# Patient Record
Sex: MALE | Race: OTHER | HISPANIC OR LATINO | URBAN - METROPOLITAN AREA
[De-identification: names, ages, dates, MRNs, and addresses within clinical notes are randomized per-mention and may not be internally consistent; named-entity substitution may affect disease eponyms.]

---

## 2020-12-06 ENCOUNTER — EMERGENCY (EMERGENCY)
Facility: HOSPITAL | Age: 40
LOS: 1 days | Discharge: HOME | End: 2020-12-06
Attending: EMERGENCY MEDICINE | Admitting: EMERGENCY MEDICINE
Payer: MEDICAID

## 2020-12-06 VITALS
SYSTOLIC BLOOD PRESSURE: 131 MMHG | OXYGEN SATURATION: 98 % | DIASTOLIC BLOOD PRESSURE: 90 MMHG | RESPIRATION RATE: 18 BRPM | TEMPERATURE: 98 F | HEART RATE: 80 BPM

## 2020-12-06 DIAGNOSIS — Z91.013 ALLERGY TO SEAFOOD: ICD-10-CM

## 2020-12-06 DIAGNOSIS — R05 COUGH: ICD-10-CM

## 2020-12-06 DIAGNOSIS — R10.84 GENERALIZED ABDOMINAL PAIN: ICD-10-CM

## 2020-12-06 DIAGNOSIS — Z20.828 CONTACT WITH AND (SUSPECTED) EXPOSURE TO OTHER VIRAL COMMUNICABLE DISEASES: ICD-10-CM

## 2020-12-06 DIAGNOSIS — R07.2 PRECORDIAL PAIN: ICD-10-CM

## 2020-12-06 LAB
ALBUMIN SERPL ELPH-MCNC: 4.2 G/DL — SIGNIFICANT CHANGE UP (ref 3.3–5)
ALP SERPL-CCNC: 59 U/L — SIGNIFICANT CHANGE UP (ref 40–120)
ALT FLD-CCNC: 19 U/L — SIGNIFICANT CHANGE UP (ref 10–45)
ANION GAP SERPL CALC-SCNC: 16 MMOL/L — SIGNIFICANT CHANGE UP (ref 5–17)
AST SERPL-CCNC: 25 U/L — SIGNIFICANT CHANGE UP (ref 10–40)
BASOPHILS # BLD AUTO: 0.08 K/UL — SIGNIFICANT CHANGE UP (ref 0–0.2)
BASOPHILS NFR BLD AUTO: 0.7 % — SIGNIFICANT CHANGE UP (ref 0–2)
BILIRUB SERPL-MCNC: 0.2 MG/DL — SIGNIFICANT CHANGE UP (ref 0.2–1.2)
BUN SERPL-MCNC: 9 MG/DL — SIGNIFICANT CHANGE UP (ref 7–23)
CALCIUM SERPL-MCNC: 9.2 MG/DL — SIGNIFICANT CHANGE UP (ref 8.4–10.5)
CHLORIDE SERPL-SCNC: 99 MMOL/L — SIGNIFICANT CHANGE UP (ref 96–108)
CO2 SERPL-SCNC: 23 MMOL/L — SIGNIFICANT CHANGE UP (ref 22–31)
CREAT SERPL-MCNC: 0.57 MG/DL — SIGNIFICANT CHANGE UP (ref 0.5–1.3)
EOSINOPHIL # BLD AUTO: 0.05 K/UL — SIGNIFICANT CHANGE UP (ref 0–0.5)
EOSINOPHIL NFR BLD AUTO: 0.4 % — SIGNIFICANT CHANGE UP (ref 0–6)
GLUCOSE SERPL-MCNC: 100 MG/DL — HIGH (ref 70–99)
HCT VFR BLD CALC: 35 % — LOW (ref 39–50)
HGB BLD-MCNC: 10.8 G/DL — LOW (ref 13–17)
IMM GRANULOCYTES NFR BLD AUTO: 0.3 % — SIGNIFICANT CHANGE UP (ref 0–1.5)
LIDOCAIN IGE QN: 20 U/L — SIGNIFICANT CHANGE UP (ref 7–60)
LYMPHOCYTES # BLD AUTO: 18.2 % — SIGNIFICANT CHANGE UP (ref 13–44)
LYMPHOCYTES # BLD AUTO: 2.12 K/UL — SIGNIFICANT CHANGE UP (ref 1–3.3)
MCHC RBC-ENTMCNC: 26.2 PG — LOW (ref 27–34)
MCHC RBC-ENTMCNC: 30.9 GM/DL — LOW (ref 32–36)
MCV RBC AUTO: 84.7 FL — SIGNIFICANT CHANGE UP (ref 80–100)
MONOCYTES # BLD AUTO: 0.71 K/UL — SIGNIFICANT CHANGE UP (ref 0–0.9)
MONOCYTES NFR BLD AUTO: 6.1 % — SIGNIFICANT CHANGE UP (ref 2–14)
NEUTROPHILS # BLD AUTO: 8.65 K/UL — HIGH (ref 1.8–7.4)
NEUTROPHILS NFR BLD AUTO: 74.3 % — SIGNIFICANT CHANGE UP (ref 43–77)
NRBC # BLD: 0 /100 WBCS — SIGNIFICANT CHANGE UP (ref 0–0)
PLATELET # BLD AUTO: 220 K/UL — SIGNIFICANT CHANGE UP (ref 150–400)
POTASSIUM SERPL-MCNC: 3.4 MMOL/L — LOW (ref 3.5–5.3)
POTASSIUM SERPL-SCNC: 3.4 MMOL/L — LOW (ref 3.5–5.3)
PROT SERPL-MCNC: 7.1 G/DL — SIGNIFICANT CHANGE UP (ref 6–8.3)
RBC # BLD: 4.13 M/UL — LOW (ref 4.2–5.8)
RBC # FLD: 15.1 % — HIGH (ref 10.3–14.5)
SODIUM SERPL-SCNC: 138 MMOL/L — SIGNIFICANT CHANGE UP (ref 135–145)
TROPONIN T SERPL-MCNC: <0.01 NG/ML — SIGNIFICANT CHANGE UP (ref 0–0.01)
WBC # BLD: 11.65 K/UL — HIGH (ref 3.8–10.5)
WBC # FLD AUTO: 11.65 K/UL — HIGH (ref 3.8–10.5)

## 2020-12-06 PROCEDURE — 83690 ASSAY OF LIPASE: CPT

## 2020-12-06 PROCEDURE — 99285 EMERGENCY DEPT VISIT HI MDM: CPT | Mod: 25

## 2020-12-06 PROCEDURE — 36415 COLL VENOUS BLD VENIPUNCTURE: CPT

## 2020-12-06 PROCEDURE — U0003: CPT

## 2020-12-06 PROCEDURE — 80053 COMPREHEN METABOLIC PANEL: CPT

## 2020-12-06 PROCEDURE — 96374 THER/PROPH/DIAG INJ IV PUSH: CPT

## 2020-12-06 PROCEDURE — 71046 X-RAY EXAM CHEST 2 VIEWS: CPT | Mod: 26

## 2020-12-06 PROCEDURE — 93005 ELECTROCARDIOGRAM TRACING: CPT

## 2020-12-06 PROCEDURE — 99284 EMERGENCY DEPT VISIT MOD MDM: CPT | Mod: 25

## 2020-12-06 PROCEDURE — 71046 X-RAY EXAM CHEST 2 VIEWS: CPT

## 2020-12-06 PROCEDURE — 93010 ELECTROCARDIOGRAM REPORT: CPT

## 2020-12-06 PROCEDURE — 85025 COMPLETE CBC W/AUTO DIFF WBC: CPT

## 2020-12-06 PROCEDURE — 84484 ASSAY OF TROPONIN QUANT: CPT

## 2020-12-06 RX ORDER — FAMOTIDINE 10 MG/ML
20 INJECTION INTRAVENOUS ONCE
Refills: 0 | Status: COMPLETED | OUTPATIENT
Start: 2020-12-06 | End: 2020-12-06

## 2020-12-06 RX ORDER — LIDOCAINE 4 G/100G
10 CREAM TOPICAL ONCE
Refills: 0 | Status: COMPLETED | OUTPATIENT
Start: 2020-12-06 | End: 2020-12-06

## 2020-12-06 RX ADMIN — LIDOCAINE 10 MILLILITER(S): 4 CREAM TOPICAL at 19:55

## 2020-12-06 RX ADMIN — FAMOTIDINE 20 MILLIGRAM(S): 10 INJECTION INTRAVENOUS at 19:55

## 2020-12-06 RX ADMIN — Medication 30 MILLILITER(S): at 19:55

## 2020-12-06 NOTE — ED PROVIDER NOTE - CLINICAL SUMMARY MEDICAL DECISION MAKING FREE TEXT BOX
Patient is a 39 year old male with no PMH currently undomiciled with history of EtOH abuse and unknown h/o EtOH withdrawal, who comes into the ED today with abdominal and chest pain which started two days ago and is worse with EtOH. Pt appears well although tearful, abdomen soft and non tender, heart normal rate and rhythm and lungs are clear to auscultations. Plan to obtain labs, give medications, get chest x-ray and reassess. Patient is a 39 year old male with no PMH currently undomiciled with history of EtOH abuse and unknown h/o EtOH withdrawal, who comes into the ED today with abdominal and chest pain which started two days ago and is worse with EtOH. Pt appears well although tearful, abdomen soft and non tender, heart normal rate and rhythm and lungs are clear to auscultations. Plan to obtain labs, give medications, get chest x-ray and reassess. labs reviewed, no acute finding ekg/cxr. believe pt medically cleared. ?gastritis 2/2 alcohol or food ingestion 2 days ago. social work consulted-- pt provided with shelter information as well as detox. pt does nto appear in withdrawal. pt medically cleared. pt dc home in stable condition. ED evaluation and management discussed with the patient and family (if available) in detail.  Close PMD follow up encouraged.  Strict ED return instructions discussed in detail and patient given the opportunity to ask any questions about their discharge diagnosis and instructions. Patient verbalized understanding. Patient is agreeable to plan. Patient is a 39 year old male with no PMH currently undomiciled with history of EtOH abuse and unknown h/o EtOH withdrawal, who comes into the ED today with abdominal and chest pain which started two days ago and is worse with EtOH. Pt appears well although tearful, abdomen soft and non tender, heart normal rate and rhythm and lungs are clear to auscultations. Plan to obtain labs, give medications, get chest x-ray and reassess. labs reviewed, no acute finding ekg/cxr. believe pt medically cleared. ?gastritis 2/2 alcohol or food ingestion 2 days ago. social work consulted-- pt provided with shelter information as well as detox. pt does not appear in withdrawal. pt medically cleared. pt dc home in stable condition. ED evaluation and management discussed with the patient and family (if available) in detail.  Close PMD follow up encouraged.  Strict ED return instructions discussed in detail and patient given the opportunity to ask any questions about their discharge diagnosis and instructions. Patient verbalized understanding. Patient is agreeable to plan.

## 2020-12-06 NOTE — ED PROVIDER NOTE - PATIENT PORTAL LINK FT
You can access the FollowMyHealth Patient Portal offered by [facility] by registering at the following website: [email or web address]. By joining [employer]’s FollowMyHealth portal, you will also be able to view your health information using other applications (apps) compatible with our system.

## 2020-12-06 NOTE — ED ADULT NURSE NOTE - CHPI ED NUR SYMPTOMS NEG
no fever/no chills/no dysuria/no hematuria/no nausea/no abdominal distension/no burning urination/no blood in stool/no diarrhea/no vomiting

## 2020-12-06 NOTE — ED PROVIDER NOTE - PHYSICAL EXAMINATION
General: Patient is well developed and well nourished. Patient is alert and oriented to person, place and date. Patient is sitting stretcher and appears in no acute distress.  HEENT: Head is normocephalic and atraumatic. Pupils are equal, round and reactive. Extraocular movements intact. No evidence of nystagmus, conjunctival injection, or scleral icterus. External ears symmetric without evidence of discharge.  Nose is symmetric, non-tender, patent without evidence of discharge. Teeth in good repair. Uvula midline.   Neck: Supple with no evidence of lymphadenopathy.  Full range of motion.  Heart: Regular rate and rhythm. No murmurs, rubs or gallops.   Lungs: Clear to auscultation bilaterally with equal chest expansion. No note of wheezes, rhonchi, rales. Equal chest expansion. No note of retractions.  Abdomen: Bowel sounds present in all four quadrants. Soft, non-tender, non-distended without signs of masses, rebound or guarding. No note of hepatosplenomegaly. No CVA tenderness bilaterally. Negative Lock sign or McBurney's.  Musculoskeletal: No edema, erythema, ecchymosis, atrophy or deformity. F No clubbing or cyanosis. No point tenderness to palpation.   Neuro: Cranial nerves II-XII intact. GCS 15. Moving all extremities. Strength is 5/5 arms and legs bilaterally. Sensation intact in extremities. gait steady   Skin: Warm, dry and intact without evidence of rashes, bruising, pallor, jaundice or cyanosis.   Psych: Mood and affect appropriate.

## 2020-12-06 NOTE — ED PROVIDER NOTE - OBJECTIVE STATEMENT
Patient is a 39 year old male with no PMH, currently undomiciled, who presents to the ED today c/o abdominal and chest pain starting two days ago after he ate some "bad food". Patient does not endorse nausea, vomiting, diarrhea, palpitations, fever or chills, and no change in bowel movement. Patient notes he also has a cough present on the left side of chest which he has never had before.   Patient is a daily EtOH drinker, though he notes it is circumstantial as he lost his safe haven housing eight weeks ago and is now living on the streets. He has no  or  at this point in time. Patient has no h/o EtOH withdrawal and no h/o seizures; patient is unsure if he can go one day without EtOH without experiencing withdrawal symptoms. Patient is a 39 year old male with no PMH, currently undomiciled, who presents to the ED today c/o abdominal and chest pain starting two days ago after he ate some "bad food". Patient does not endorse nausea, vomiting, diarrhea, palpitations, fever or chills, and no change in bowel movement. Patient notes he also has a nonradiating nonpositional nonexertinal nonpleuritic pain present on the left side of chest which he has never had before.   Patient is a recent daily EtOH drinker, though he notes it is circumstantial as he lost his safe haven housing eight weeks ago and is now living on the streets. He has no  or  at this point in time. Patient has no h/o EtOH withdrawal and no h/o seizures; patient is unsure if he can go one day without EtOH without experiencing withdrawal symptoms.

## 2020-12-06 NOTE — ED ADULT NURSE NOTE - NSIMPLEMENTINTERV_GEN_ALL_ED
Implemented All Fall Risk Interventions:  [place] to call system. Call bell, personal items and telephone within reach. Instruct patient to call for assistance. Room bathroom lighting operational. Non-slip footwear when patient is off stretcher. Physically safe environment: no spills, clutter or unnecessary equipment. Stretcher in lowest position, wheels locked, appropriate side rails in place. Provide visual cue, wrist band, yellow gown, etc. Monitor gait and stability. Monitor for mental status changes and reorient to person, place, and time. Review medications for side effects contributing to fall risk. Reinforce activity limits and safety measures with patient and family.

## 2020-12-06 NOTE — ED PROVIDER NOTE - ATTENDING CONTRIBUTION TO CARE
avss. nontoxic. NAD. no systemic sx. no active cp. no acute resp distress. abd soft, nd, nttp. no scrotal pain. no red flags. no e/o sepsis. LFTs/lipase neg. trop neg. ekg w/o acute abnl. cxr w/o acute focal consol vs ptx vs pulm edema. tolerating po. pt upset/tearful re: being recently kicked out of shelter and having "no place to go." social work consulted. resources provided. no indication for emergent ct imaging at this time. will dc w/ outpatient pcp fu, social work resources. strict return precautions. pt agrees w/ plan. questions answered.     I saw and discussed the care of the pt directly with the ACP while the pt was in the ED. i have reviewed the ACP note and agree w/ the history, exam and plan of care other than as noted above.

## 2020-12-06 NOTE — ED ADULT NURSE NOTE - OBJECTIVE STATEMENT
Pt presents to ED c/o abdominal pain. Pt states "I ate something bad two days ago, I started getting stomach pain in the upper part of my stomach. It has been getting worse and its moving towards my chest. I have no where to sleep so I have been walking around in the cold for two days, my feet hurt and I am tired". Pt endorses alcohol and marijuana use today. Denies hx of alcohol withdrawals. A&ox4, ambulatory w steady gait. Denies fevers, chills, n/v/d, sob, lightheadedness, dizziness.

## 2020-12-06 NOTE — ED ADULT NURSE NOTE - NS ED NURSE ELOPE COMMENTS
pt refused to be referred to the shelter/facility. decided to walk out, no deficit, A+Ox4, steady gait, denies sob, cp, abd .pain at this time.

## 2020-12-07 LAB — SARS-COV-2 RNA SPEC QL NAA+PROBE: SIGNIFICANT CHANGE UP

## 2022-01-17 ENCOUNTER — EMERGENCY (EMERGENCY)
Facility: HOSPITAL | Age: 42
LOS: 1 days | Discharge: HOME | End: 2022-01-17
Attending: EMERGENCY MEDICINE | Admitting: EMERGENCY MEDICINE
Payer: MEDICAID

## 2022-01-17 VITALS
HEART RATE: 100 BPM | DIASTOLIC BLOOD PRESSURE: 91 MMHG | OXYGEN SATURATION: 98 % | WEIGHT: 225.09 LBS | TEMPERATURE: 99 F | RESPIRATION RATE: 19 BRPM | SYSTOLIC BLOOD PRESSURE: 141 MMHG

## 2022-01-17 DIAGNOSIS — R05.9 COUGH, UNSPECIFIED: ICD-10-CM

## 2022-01-17 DIAGNOSIS — F17.200 NICOTINE DEPENDENCE, UNSPECIFIED, UNCOMPLICATED: ICD-10-CM

## 2022-01-17 DIAGNOSIS — U07.1 COVID-19: ICD-10-CM

## 2022-01-17 DIAGNOSIS — Z91.013 ALLERGY TO SEAFOOD: ICD-10-CM

## 2022-01-17 PROBLEM — F10.10 ALCOHOL ABUSE, UNCOMPLICATED: Chronic | Status: ACTIVE | Noted: 2020-12-06

## 2022-01-17 LAB — SARS-COV-2 RNA SPEC QL NAA+PROBE: DETECTED

## 2022-01-17 PROCEDURE — 99284 EMERGENCY DEPT VISIT MOD MDM: CPT

## 2022-01-17 RX ORDER — ONDANSETRON 8 MG/1
4 TABLET, FILM COATED ORAL ONCE
Refills: 0 | Status: COMPLETED | OUTPATIENT
Start: 2022-01-17 | End: 2022-01-17

## 2022-01-17 RX ORDER — IBUPROFEN 200 MG
400 TABLET ORAL ONCE
Refills: 0 | Status: COMPLETED | OUTPATIENT
Start: 2022-01-17 | End: 2022-01-17

## 2022-01-17 RX ORDER — ACETAMINOPHEN 500 MG
975 TABLET ORAL ONCE
Refills: 0 | Status: COMPLETED | OUTPATIENT
Start: 2022-01-17 | End: 2022-01-17

## 2022-01-17 RX ADMIN — ONDANSETRON 4 MILLIGRAM(S): 8 TABLET, FILM COATED ORAL at 13:41

## 2022-01-17 RX ADMIN — Medication 400 MILLIGRAM(S): at 13:41

## 2022-01-17 RX ADMIN — Medication 975 MILLIGRAM(S): at 13:40

## 2022-01-17 RX ADMIN — Medication 100 MILLIGRAM(S): at 13:41

## 2022-01-17 NOTE — ED PROVIDER NOTE - PROGRESS NOTE DETAILS
Jenaro KAPOOR (PGY-3): Patient becoming agitated, verbally abusive, refusing to wear a mask, refusing to remain in room with door closed, refused CXR, stating he will not let a male tech to touch him, no female tech available, explained to patient I will remain in room with him with male xray tech, but patient began yelling and refusing, spit on wall. Security called to escort patient out of Emergency Department. Unable to verbally de-escalate situation. Patient escorted out of ED.

## 2022-01-17 NOTE — ED ADULT NURSE NOTE - NSIMPLEMENTINTERV_GEN_ALL_ED
Implemented All Universal Safety Interventions:  [place] to call system. Call bell, personal items and telephone within reach. Instruct patient to call for assistance. Room bathroom lighting operational. Non-slip footwear when patient is off stretcher. Physically safe environment: no spills, clutter or unnecessary equipment. Stretcher in lowest position, wheels locked, appropriate side rails in place.

## 2022-01-17 NOTE — ED ADULT NURSE NOTE - SUICIDE SCREENING QUESTION 2
Plan of care discussed with patient at start of shift. Free from falls and injuries. Resting quietly with eyes closed. Respirations even, unlabored. Skin warm and dry. Denies pain. Denies nausea. Afebrile this shift. Telemetry in use with a fib noted. Frequent checks for pain and safety maintained. Bed in lowest position, wheels locked, side rails up x's 2, call light in reach. Instructed to call for assistance as needed, verbalizes understanding. Will continue to monitor.    Patient refused

## 2022-01-17 NOTE — ED ADULT NURSE NOTE - CAS ELECT INFOMATION PROVIDED
ESCORTED OUT WITH SEVERAL SECURITY AND [employer]. PT THREATENING STAFF AND TRYING TO COUGH ON STAFF MEMBERS. ESCALATED TO CHARGE RN FOR COMPLEX CARE NOTE

## 2022-01-17 NOTE — ED PROVIDER NOTE - OBJECTIVE STATEMENT
41y M w/ no PMHx presenting with cough, sob, nausea, sore throat and intermittent fever x5 days. When asked if patient is COVID vaccinated, responds "I don't know". States he got into a verbal altercation with stranger on street, was "spit on" and then the next day developed symptoms. States he has been only taking Aleve at home for his symptoms, last dose was at 4am. Lives with roommate. Denies dizziness, chest pain, abdominal pain, diarrhea, bloody stools, dysuria, hematuria, rash. Patient becoming agitated in room while obtaining history.

## 2022-01-17 NOTE — ED PROVIDER NOTE - ATTENDING CONTRIBUTION TO CARE
Pt presents w several days of cough and body aches. Evaluated and then became aggressive, verbally abusive towards staff. Had to be escorted out bu security. Had normal O2 sats. Low concern for emergent medical condition.

## 2022-01-17 NOTE — ED PROVIDER NOTE - PATIENT PORTAL LINK FT
You can access the FollowMyHealth Patient Portal offered by [facility] by registering at the following website: [email or web address]. By joining [employer]’s FollowMyHealth portal, you will also be able to view your health information using other applications (apps) compatible with our system.

## 2022-01-17 NOTE — ED ADULT NURSE NOTE - OBJECTIVE STATEMENT
pt c/o lungs hurting and not feeling well. pt aggressive, angry, hostile during primary RN assessment. unable to auscultate lungs and heart due to agitation. +yelling in full sentences. spitting on the wall

## 2022-01-17 NOTE — ED PROVIDER NOTE - PHYSICAL EXAMINATION
Physical Exam:  Gen: NAD, AOx3, non-toxic appearing, able to ambulate without assistance  HEENT: EOMI, PEERL, normal conjunctiva, tongue midline, oral mucosa moist  Lung: CTAB, no respiratory distress, no wheezes/rhonchi/rales B/L, speaking in full sentences  CV: RRR, no murmurs, rubs or gallops  Abd: soft, NT, ND, no guarding  MSK: no visible deformities, ROM normal in UE/LE, no back pain  Neuro: No focal sensory or motor deficits  Skin: Warm, well perfused, no rash, no leg swelling  Psych: agitated  Priyanka Eason D.O. -Resident

## 2022-01-17 NOTE — ED PROVIDER NOTE - NS ED ROS FT
CONSTITUTIONAL: + fevers, no chills, no lightheadedness, no dizziness  EYES: no visual changes, no eye pain  EARS: no ear drainage, no ear pain, no change in hearing  NOSE: no nasal congestion  MOUTH/THROAT: + sore throat  CV: No chest pain, no palpitations  RESP: + SOB, + cough  GI: +nausea, no v/d, no abd pain  : no dysuria, no hematuria, no flank pain  MSK: no back pain, no extremity pain  SKIN: no rashes  NEURO: no headache, no focal weakness, no decreased sensation/paresthesias

## 2022-01-17 NOTE — ED PROVIDER NOTE - CLINICAL SUMMARY MEDICAL DECISION MAKING FREE TEXT BOX
41y M w/ no PMHx presenting with cough, sob, nausea, sore throat and intermittent fever x5 days. , low-grade fever, lungs CTAB, abd soft, NT, heart sounds clear. Unknown vaccination hx, no active chest pain, patient speaking in full sentences, likely viral etiology, will obtain CXR, COVID swab, treat symptomatically and reassess.

## 2022-01-17 NOTE — ED PROVIDER NOTE - NSFOLLOWUPINSTRUCTIONS_ED_ALL_ED_FT
History of Present Illness


Admission Date/PCP: 


  11/13/19 15:14





  SID HERCULES MD





History of Present Illness: 


IVETT MIRANDA is a 77 year old female who came into the emergency room for an 

episode of vertigo this morning.  Patient states she has had 2 or 3 of these in 

fact is seeing her primary care provider for this and was given a prescription 

for for what sounds like meclizine.  


Patient came into the emergency room for this vertigo-like symptom.  She states 

that the room is spinning.  Symptoms only lasted about 5 minutes and since then 

she is been feeling well.





She came to the emergency room she was somewhat hypoxic with sat of 88 or 8089 

on room air, supposed to be using home O2 as needed but she does not.  ER 

provider sent her for a CT angiogram to make sure she did not have a pulmonary 

embolism while she was in the scanner she developed what sounds to be flash pu

lmonary edema.  Patient had crackles and was noticeably short of breath with an 

O2 sat of 77% back in the ER.





Patient confesses that over the last 2 months she is gained 20 pounds since 

starting a new medication, trilogy.  She also admits that she not take her 

diuretic on a daily basis as she is supposed to. 


Chest x-ray was unremarkable but CT scan does show possible pneumonia.  Patient 

will be put in the hospital for observation status and started on antibiotics, 

ever I am not convinced that this to be treated in light of her symptoms, or 

lack thereof.  No fever no chills no white count no sputum production.





Will order a CT without contrast because of her vertigo symptoms








Past Medical History


Cardiac Medical History: Reports: Hyperlipidema, Hypertension


Pulmonary Medical History: Reports: Chronic Obstructive Pulmonary Disease (COPD)

- emphysema


Endocrine Medical History: Reports: Diabetes Mellitus Type 2





Social History


Smoking Status: Former Smoker





- Advance Directive


Resuscitation Status: Full Code





Family History


Family History: Reviewed & Not Pertinent


Parental Family History Reviewed: No


Children Family History Reviewed: No


Sibling(s) Family History Reviewed.: No





Medication/Allergy


Allergies/Adverse Reactions: 


                                        





aspirin [Aspirin] Allergy (Verified 11/13/19 10:12)


   


Penicillins Allergy (Verified 11/13/19 10:12)


   











Review of Systems


Constitutional: PRESENT: weight gain - Vertigo and 20 pound weight gain in 2 

months, other - Vertigo


Respiratory: ABSENT: cough, hemoptysis


Gastrointestinal: ABSENT: abdominal pain, constipation, diarrhea, hematemesis, 

hematochezia, nausea, vomiting


Neurological: PRESENT: vertigo


Psychiatric: ABSENT: anxiety, depression, homidical ideation, suicidal ideation





Physical Exam


Vital Signs: 


                                        











Temp Pulse Resp BP Pulse Ox


 


 98.7 F   82   28 H  131/59 H  98 


 


 11/13/19 10:21  11/13/19 09:58  11/13/19 14:30  11/13/19 12:00  11/13/19 14:30








                                 Intake & Output











 11/12/19 11/13/19 11/14/19





 06:59 06:59 06:59


 


Intake Total   50


 


Balance   50


 


Weight   81.647 kg











General appearance: PRESENT: no acute distress, mild distress, well-developed, 

well-nourished, other - Patient just vomited after she took an oral dose of 

Zithromax However no shortness of breath


Respiratory exam: PRESENT: clear to auscultation deric.  ABSENT: rales, rhonchi, 

wheezes


Cardiovascular exam: PRESENT: RRR.  ABSENT: diastolic murmur, rubs, systolic mu

rmur


Extremities exam: PRESENT: +1 edema


Neurological exam: PRESENT: alert, awake, oriented to person, oriented to place,

 oriented to time, oriented to situation, CN II-XII grossly intact.  ABSENT: 

motor sensory deficit


Psychiatric exam: PRESENT: appropriate affect, normal mood.  ABSENT: homicidal 

ideation, suicidal ideation





Results


Laboratory Results: 


                                        





                                 11/13/19 09:49 





                                 11/13/19 09:49 





                                        











  11/13/19 11/13/19 11/13/19





  09:49 09:49 12:53


 


WBC  8.8  


 


RBC  4.84  


 


Hgb  15.0  


 


Hct  42.8  


 


MCV  89  


 


MCH  31.0  


 


MCHC  34.9  


 


RDW  13.3  


 


Plt Count  357  


 


Seg Neutrophils %  Not Reportable  


 


Sodium   143.9 


 


Potassium   4.1 


 


Chloride   104 


 


Carbon Dioxide   28 


 


Anion Gap   12 


 


BUN   10 


 


Creatinine   0.57 


 


Est GFR ( Amer)   > 60 


 


Glucose   162 H 


 


Calcium   9.4 


 


Total Bilirubin   1.0 


 


AST   28 


 


Alkaline Phosphatase   58 


 


Total Protein   7.9 


 


Albumin   4.5 


 


Urine Color    YELLOW


 


Urine Appearance    CLEAR


 


Urine pH    7.0


 


Ur Specific Gravity    1.010


 


Urine Protein    NEGATIVE


 


Urine Glucose (UA)    NEGATIVE


 


Urine Ketones    NEGATIVE


 


Urine Blood    NEGATIVE


 


Urine Nitrite    NEGATIVE


 


Ur Leukocyte Esterase    TRACE H


 


Urine WBC (Auto)    1








                                        











  11/13/19





  09:49


 


Troponin I  < 0.012











Impressions: 


                                        





Chest X-Ray  11/13/19 10:10


IMPRESSION:  Limited airspace disease in the left base, atelectasis versus 

pneumonia.


 








Chest/Abdomen CTA  11/13/19 13:17


IMPRESSION:  Bibasilar atelectasis.  Lingular infiltrate either atelectasis or 

focal pneumonia.


Bilateral emphysematous changes.


No pulmonary emboli.


 














Assessment and Plan





- Diagnosis


(1) Pulmonary edema


Is this a current diagnosis for this admission?: Yes   





(2) Hypertension


Is this a current diagnosis for this admission?: Yes   





(3) Diabetes


Is this a current diagnosis for this admission?: Yes   





(4) COPD (chronic obstructive pulmonary disease)


Is this a current diagnosis for this admission?: Yes   





(5) Hypoxia


Is this a current diagnosis for this admission?: Yes   





(6) Respiratory distress


Is this a current diagnosis for this admission?: Yes   





(7) Vertigo


Is this a current diagnosis for this admission?: Yes   





- Plan Summary


Summary: 


Will treat patient for pulmonary edema with Lasix 20 mg every 12 hours, 10 you t

he IV Rocephin for another 12 to 24 hours pending labs, order CT head scan 

without contrast for her vertigo.


Anticipate discharge either tomorrow or Friday.  May or may not be treated as an

 outpatient for pneumonia





- Time


Time Spent with patient: 35 or more minutes - Lab and imaging results, if performed, were discussed with you along with your discharge diagnosis    - Follow up with your doctor in 1 week    - Return to the ED for any new, worsening, or concerning symptoms to you, worsening shortness of breath, chest pain    - Continue all prescribed medications    - Take ibuprofen/tylenol as directed as needed for pain  To control your pain at home, you should take Ibuprofen 400 mg along with Tylenol 650mg-1000mg every 6 to 8 hours. Limit your maximum daily Tylenol from all sources to 4000mg. Be aware that many other medications contain acetaminophen which is also known as Tylenol. Taking Tylenol and Ibuprofen together has been shown to be more effective at relieving pain than taking them separately. These are both over the counter medications that you can  at your local pharmacy without a prescription. You need to respect all of the warnings on the bottles. You shouldn’t take these medications for more than a week without following up with your doctor. Both medications come with certain risks and side effects that you need to discuss with your doctor, especially if you are taking them for a prolonged period.    - Rest and keep yourself hydrated with fluids

## 2022-02-12 ENCOUNTER — EMERGENCY (EMERGENCY)
Facility: HOSPITAL | Age: 42
LOS: 1 days | Discharge: LEFT BEFORE DISCHARGE | End: 2022-02-12
Admitting: EMERGENCY MEDICINE
Payer: SELF-PAY

## 2022-02-12 VITALS
RESPIRATION RATE: 18 BRPM | DIASTOLIC BLOOD PRESSURE: 84 MMHG | TEMPERATURE: 97 F | SYSTOLIC BLOOD PRESSURE: 145 MMHG | OXYGEN SATURATION: 99 % | HEART RATE: 102 BPM

## 2022-02-12 DIAGNOSIS — Z53.21 PROCEDURE AND TREATMENT NOT CARRIED OUT DUE TO PATIENT LEAVING PRIOR TO BEING SEEN BY HEALTH CARE PROVIDER: ICD-10-CM

## 2022-02-12 PROCEDURE — L9991: CPT

## 2022-02-12 NOTE — ED ADULT TRIAGE NOTE - CHIEF COMPLAINT QUOTE
pt BIBA complaining of not feeling well after using crystal meth three days ago. pt aggressive and yelling in triage. violent with ems and threatening. escorted out by security from triage for violent behavior.

## 2022-03-25 ENCOUNTER — EMERGENCY (EMERGENCY)
Facility: HOSPITAL | Age: 42
LOS: 1 days | Discharge: HOME | End: 2022-03-25
Attending: EMERGENCY MEDICINE | Admitting: EMERGENCY MEDICINE
Payer: MEDICAID

## 2022-03-25 VITALS
RESPIRATION RATE: 18 BRPM | SYSTOLIC BLOOD PRESSURE: 122 MMHG | TEMPERATURE: 98 F | HEART RATE: 100 BPM | DIASTOLIC BLOOD PRESSURE: 76 MMHG | OXYGEN SATURATION: 96 %

## 2022-03-25 VITALS
SYSTOLIC BLOOD PRESSURE: 108 MMHG | OXYGEN SATURATION: 95 % | HEART RATE: 110 BPM | DIASTOLIC BLOOD PRESSURE: 71 MMHG | RESPIRATION RATE: 1 BRPM | TEMPERATURE: 99 F

## 2022-03-25 DIAGNOSIS — F10.920 ALCOHOL USE, UNSPECIFIED WITH INTOXICATION, UNCOMPLICATED: ICD-10-CM

## 2022-03-25 PROCEDURE — 99284 EMERGENCY DEPT VISIT MOD MDM: CPT

## 2022-03-25 NOTE — ED ADULT NURSE NOTE - OBJECTIVE STATEMENT
40 y/o male who admits to drinking alcohol- pt picked up at [facility]- presents with no obvious injury or trauma

## 2022-03-25 NOTE — ED PROVIDER NOTE - NS ED ROS FT
BIB EMS for EtOH intoxication.  Admits to heavy EtOH use today.  No acute medical complaints or apparent trauma noted. Unable to cooperate with remainder of ROS due to clinical condition/AMS.

## 2022-03-25 NOTE — ED PROVIDER NOTE - OBJECTIVE STATEMENT
BIB EMS for EtOH intoxication.  Admits to heavy EtOH use today.  No acute medical complaints or apparent trauma noted. Unable to cooperate with remainder of history due to clinical condition/AMS.

## 2022-03-25 NOTE — ED PROVIDER NOTE - PATIENT PORTAL LINK FT
You can access the FollowMyHealth Patient Portal offered by [facility] by registering at the following website: [email or web address]. By joining [employer]’s FollowMyHealth portal, you will also be able to view your health information using other applications (apps) compatible with our system.

## 2022-06-19 ENCOUNTER — EMERGENCY (EMERGENCY)
Facility: HOSPITAL | Age: 42
LOS: 0 days | Discharge: HOME | End: 2022-06-19
Attending: EMERGENCY MEDICINE
Payer: SELF-PAY

## 2022-06-19 VITALS
OXYGEN SATURATION: 100 % | TEMPERATURE: 98 F | HEART RATE: 75 BPM | DIASTOLIC BLOOD PRESSURE: 76 MMHG | RESPIRATION RATE: 18 BRPM | SYSTOLIC BLOOD PRESSURE: 134 MMHG

## 2022-06-19 VITALS
WEIGHT: 259.93 LBS | OXYGEN SATURATION: 97 % | HEART RATE: 99 BPM | DIASTOLIC BLOOD PRESSURE: 61 MMHG | SYSTOLIC BLOOD PRESSURE: 121 MMHG | RESPIRATION RATE: 18 BRPM | HEIGHT: 70 IN | TEMPERATURE: 99 F

## 2022-06-19 DIAGNOSIS — F10.920 ALCOHOL USE, UNSPECIFIED WITH INTOXICATION, UNCOMPLICATED: ICD-10-CM

## 2022-06-19 DIAGNOSIS — Z59.00 HOMELESSNESS UNSPECIFIED: ICD-10-CM

## 2022-06-19 PROCEDURE — 99053 MED SERV 10PM-8AM 24 HR FAC: CPT

## 2022-06-19 PROCEDURE — 99284 EMERGENCY DEPT VISIT MOD MDM: CPT

## 2022-06-19 PROCEDURE — 99285 EMERGENCY DEPT VISIT HI MDM: CPT

## 2022-06-19 SDOH — ECONOMIC STABILITY - HOUSING INSECURITY: HOMELESSNESS UNSPECIFIED: Z59.00

## 2022-06-19 NOTE — ED PROVIDER NOTE - CLINICAL SUMMARY MEDICAL DECISION MAKING FREE TEXT BOX
Rudy OREILLY: No trauma, ETOH use/abuse, await to sober, reassess. If VSS, ambulatory, tolerating PO, will dc.

## 2022-06-19 NOTE — ED ADULT TRIAGE NOTE - CHIEF COMPLAINT QUOTE
Per EMS[place] +ETOH. At triage, patient refusing to answer any questions, sleeping but able to arouse, VS stable, no distress noted. Unknown which knee has pain, moving both legs.

## 2022-06-19 NOTE — ED PROVIDER NOTE - NSFOLLOWUPINSTRUCTIONS_ED_ALL_ED_FT
Please follow up with your primary care physician if you do not have one then I have provided you with the number for a follow up clinic. Please note that you have no complaints of any pain in the ED now. you are eating, drinking, ambulatory. Please follow up with your primary care physician, return to us if any health concerns. Please come to us if you wish to get help with kicking your alcohol addiction. You can also contact [facility] and they can offer you some more support.    For all other health concerns return to us immediately.    ___________  Alcohol Intoxication      Alcohol intoxication occurs when a person no longer thinks clearly or functions well (becomes impaired) after drinking alcohol. Intoxication can occur with just one drink. The legal definition of alcohol intoxication depends on the amount of alcohol in the blood (blood alcohol concentration, JESSICA). JESSICA of 80–100 mg/dL or higher is commonly considered legally intoxicated. The level of impairment depends on:  •The amount of alcohol the person had.      •The person's age, gender, and weight.      •How often the person drinks.      •Whether the person has other medical conditions, such as diabetes, seizures, or a heart condition.      Alcohol intoxication can range from mild to severe. The condition can be dangerous, especially if the person:  •Also took certain drugs or prescription medicines.    •Drinks a large amount of alcohol in a short period of time (binge drinks).  •For women, binge drinking is having four or more drinks at one time.      •For men, binge drinking is having five or more drinks at one time.        If you or anyone around you appears intoxicated, speak up and act.      What are the causes?    This condition is caused by drinking alcohol.      What increases the risk?    The following factors may make you more likely to develop this condition:  •Peer pressure in young adults.      •Difficulty managing stress.      •History of drug or alcohol abuse.      •Combining alcohol with drugs.      •Family history of drug or alcohol abuse.      •Low body weight.      •Binge drinking.        What are the signs or symptoms?    Symptoms of alcohol intoxication can vary from person to person. Symptoms can be mild, moderate, or severe.    Symptoms of mild alcohol intoxication may include:  •Feeling relaxed or sleepy.      •Having mild difficulty with coordination, speech, memory, or attention.      Symptoms of moderate alcohol intoxication may include:  •Extreme emotions, like anger or sadness.      •Moderate difficulty with coordination, speech, memory, or attention.      Symptoms of severe alcohol intoxication may include:  •Severe difficulty with coordination, speech, memory, or attention.      •Passing out.      •Vomiting.      •Confusion.      •Slow breathing.      •Coma.      Intoxication can change quickly from mild to severe. It can cause coma or death, especially in people who are not exposed to alcohol often.      How is this diagnosed?    Your health care provider will ask you how much alcohol you drank and what kind you had. Intoxication may also be diagnosed based on:  •Your symptoms and medical history.      •A physical exam.      •A blood test that measures JESSICA.      •A smell of alcohol on your breath.        How is this treated?    Treatment for alcohol intoxication may include:  •Being monitored in an emergency department, hospital, or treatment center until your JESSICA comes down and it is safe for you to go home.      •IV fluids to prevent or treat loss of fluid in the body (dehydration).      •Medicine to treat nausea or vomiting or to get rid of alcohol in the body.      •Counseling (brief intervention) about the dangers of using alcohol.      •Treatment for substance use disorder.      •Oxygen therapy or a breathing machine (ventilator).      Long-term (chronic) exposure to alcohol can have long-term effects on your brain, heart, and gastrointestinal system. These effects can be serious and may also require treatment.      Follow these instructions at home:       Eating and drinking    • Do not drink alcohol if:  •Your health care provider tells you not to drink.      •You are pregnant, may be pregnant, or are planning to become pregnant.      •You are under the legal drinking age (21 years old in the [place]      •You are taking medicines that should not be taken with alcohol.      •You have a medical condition, and alcohol makes it worse.      •You need to drive or perform activities that require you to be alert.      •You have substance use disorder.      •Ask your health care provider if alcohol is safe for you. If your health care provider allows you to drink alcohol, limit how much you have. You may drink:  •0–1 drink a day for women.     • 0–2 drinks a day for men.   •Be aware of how much alcohol is in your drink. In the [place] one drink equals one 12 oz bottle of beer (355 mL), one 5 oz glass of wine (148 mL), or one 1½ oz shot of hard liquor (44 mL).          •Avoid drinking alcohol on an empty stomach.      •Stay hydrated. Drink enough fluid to keep your urine pale yellow. Avoid caffeine because it can dehydrate you.      •Avoid drinking more than one drink per hour.      •When having multiple drinks, drink water or a non-alcoholic beverage between alcoholic drinks.      General instructions     •Take over-the-counter and prescription medicines only as told by your health care provider.      • Do not drive after drinking any amount of alcohol. Plan for a designated  or another way to go home.      •Have someone responsible stay with you while you are intoxicated. You should not be left alone.      •Keep all follow-up visits as told by your health care provider. This is important.        Contact a health care provider if:    •You do not feel better after a few days.      •You have problems at work, at school, or at home due to drinking.        Get help right away if:  •You have any of the following:  •Moderate to severe trouble with coordination, speech, memory, or attention.      •Trouble staying awake.      •Severe confusion.      •A seizure.      •Light-headedness.      •Fainting.      •Vomiting bright red blood or material that looks like coffee grounds.      •Bloody stool (feces). The blood may make your stool bright red, black, or tarry. It may also smell bad.      •Shakiness when trying to stop drinking.      •Thoughts about hurting yourself or others.        If you ever feel like you may hurt yourself or others, or have thoughts about taking your own life, get help right away. You can go to your nearest emergency department or call:   • Your local emergency services (911 in the [place]       • A suicide crisis helpline, such as the National Suicide Prevention Lifeline at 1-150.340.5068. This is open 24 hours a day.         Summary    •Alcohol intoxication occurs when a person no longer thinks clearly or functions well after drinking alcohol.      •If your health care provider says that alcohol is safe for you, limit alcohol intake to no more than 1 drink a day for women (no drinks if you are pregnant) and 2 drinks a day for men. One drink equals 12 oz of beer, 5 oz of wine, or 1½ oz of hard liquor.      •Contact your health care provider if drinking has caused you problems at work, school, or home.      •Get help right away if you have thoughts about hurting yourself or others.      This information is not intended to replace advice given to you by your health care provider. Make sure you discuss any questions you have with your health care provider.
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
## 2022-06-19 NOTE — ED ADULT NURSE NOTE - NS PRO PASSIVE SMOKE EXP
Report to or call [facility] [phone] for concerns about pregnancy or Labor.    Next visit 2 weeks   No

## 2022-06-19 NOTE — ED PROVIDER NOTE - MUSCULOSKELETAL, MLM
Spine appears normal, range of motion is not limited, no muscle or joint tenderness. 5/5 strength on flexion and extension of all limbs. No saddle anesthesia.

## 2022-06-23 ENCOUNTER — EMERGENCY (EMERGENCY)
Facility: HOSPITAL | Age: 42
LOS: 1 days | Discharge: HOME | End: 2022-06-23
Attending: EMERGENCY MEDICINE | Admitting: EMERGENCY MEDICINE
Payer: MEDICAID

## 2022-06-23 VITALS
SYSTOLIC BLOOD PRESSURE: 155 MMHG | OXYGEN SATURATION: 99 % | DIASTOLIC BLOOD PRESSURE: 78 MMHG | RESPIRATION RATE: 18 BRPM | HEART RATE: 60 BPM

## 2022-06-23 VITALS
OXYGEN SATURATION: 99 % | WEIGHT: 199.08 LBS | RESPIRATION RATE: 18 BRPM | DIASTOLIC BLOOD PRESSURE: 94 MMHG | TEMPERATURE: 98 F | HEART RATE: 65 BPM | SYSTOLIC BLOOD PRESSURE: 160 MMHG

## 2022-06-23 DIAGNOSIS — F14.19 COCAINE ABUSE WITH UNSPECIFIED COCAINE-INDUCED DISORDER: ICD-10-CM

## 2022-06-23 DIAGNOSIS — R11.2 NAUSEA WITH VOMITING, UNSPECIFIED: ICD-10-CM

## 2022-06-23 DIAGNOSIS — Z91.018 ALLERGY TO OTHER FOODS: ICD-10-CM

## 2022-06-23 LAB — GLUCOSE BLDC GLUCOMTR-MCNC: 162 MG/DL — HIGH (ref 70–99)

## 2022-06-23 PROCEDURE — 99284 EMERGENCY DEPT VISIT MOD MDM: CPT

## 2022-06-23 RX ORDER — ONDANSETRON 8 MG/1
4 TABLET, FILM COATED ORAL ONCE
Refills: 0 | Status: COMPLETED | OUTPATIENT
Start: 2022-06-23 | End: 2022-06-23

## 2022-06-23 RX ADMIN — ONDANSETRON 4 MILLIGRAM(S): 8 TABLET, FILM COATED ORAL at 03:03

## 2022-06-23 NOTE — ED PROVIDER NOTE - CLINICAL SUMMARY MEDICAL DECISION MAKING FREE TEXT BOX
Pt presents after using crack cocaine and now feeling nauseous and dizzy. WIll treat symptomatically and let sober up

## 2022-06-23 NOTE — ED PROVIDER NOTE - DOMESTIC TRAVEL HIGH RISK QUESTION
I have seen and examined the patien. and  discussed with the  Fellow  and agree with the findings and plan as documented. Counseled the patient in regards to next steps. Unable to Assess
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
## 2022-06-23 NOTE — ED ADULT TRIAGE NOTE - CHIEF COMPLAINT QUOTE
admitted to crack use and now has a " bad reaction" and c/o dizziness. pt denies any other drug use or alcohol- pt presents with no obvious injury or trauma

## 2022-06-23 NOTE — ED PROVIDER NOTE - OBJECTIVE STATEMENT
40 yo male pt, presents by EMS after using some crack cocaine and feeling unwell. Arrives complaining mainly of nausea/vomiting and dizziness. no chest pain, no sob, no abd pain.

## 2022-06-23 NOTE — ED PROVIDER NOTE - PATIENT PORTAL LINK FT
You can access the FollowMyHealth Patient Portal offered by [facility] by registering at the following website: [email or web address]. By joining [employer]’s FollowMyHealth portal, you will also be able to view your health information using other applications (apps) compatible with our system.

## 2022-07-20 ENCOUNTER — EMERGENCY (EMERGENCY)
Facility: HOSPITAL | Age: 42
LOS: 1 days | Discharge: HOME | End: 2022-07-20
Attending: EMERGENCY MEDICINE | Admitting: EMERGENCY MEDICINE

## 2022-07-20 VITALS
TEMPERATURE: 98 F | OXYGEN SATURATION: 96 % | HEIGHT: 65 IN | DIASTOLIC BLOOD PRESSURE: 86 MMHG | RESPIRATION RATE: 14 BRPM | HEART RATE: 99 BPM | WEIGHT: 199.96 LBS | SYSTOLIC BLOOD PRESSURE: 138 MMHG

## 2022-07-20 PROBLEM — Z78.9 OTHER SPECIFIED HEALTH STATUS: Chronic | Status: ACTIVE | Noted: 2022-03-25

## 2022-07-20 PROCEDURE — 99282 EMERGENCY DEPT VISIT SF MDM: CPT
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
## 2022-07-20 NOTE — ED PROVIDER NOTE - IV ALTEPLASE DOOR HIDDEN
show Niacinamide Counseling: I recommended taking niacin or niacinamide, also know as vitamin B3, twice daily. Recent evidence suggests that taking vitamin B3 (500 mg twice daily) can reduce the risk of actinic keratoses and non-melanoma skin cancers. Side effects of vitamin B3 include flushing and headache.
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
## 2022-07-20 NOTE — ED PROVIDER NOTE - PATIENT PORTAL LINK FT
You can access the FollowMyHealth Patient Portal offered by [facility] by registering at the following website: [email or web address]. By joining [employer]’s FollowMyHealth portal, you will also be able to view your health information using other applications (apps) compatible with our system.

## 2022-07-20 NOTE — ED PROVIDER NOTE - NSFOLLOWUPINSTRUCTIONS_ED_ALL_ED_FT
Headache    A headache is pain or discomfort felt around the head or neck area. The specific cause of a headache may not be found as there are many types including tension headaches, migraine headaches, and cluster headaches. Watch your condition for any changes. Things you can do to manage your pain include taking over the counter and prescription medications as instructed by your health care provider, lying down in a dark quiet room, limiting stress, getting regular sleep, and refraining from alcohol and tobacco products.    SEEK IMMEDIATE MEDICAL CARE IF YOU HAVE ANY OF THE FOLLOWING SYMPTOMS: fever, vomiting, stiff neck, loss of vision, problems with speech, muscle weakness, loss of balance, trouble walking, passing out, or confusion.     Please get help for alcohol abuse if you drink heavily or use drugs on a regular basis.     [facility] is a good referral line for crisis and substance abuse help.   has drop in programs all over the [facility].    Return to the ER for Emergencies.     [facility]: [phone]   [facility] Substance Abuse Services: [phone], option #2   Methadone Maintenance & Ambulatory Opiate Detox: [phone]  [facility]: [phone]  [facility]: [phone]  [facility]: [phone]    [facility]: [phone], option #2   [facility] Center: [phone]    [facility]: [phone]    [facility] Central Intake: [phone]  [facility] Chemical Dependency/Ancillary Withdrawal: [phone]  [facility] Methadone Maintenance: [phone]    [facility]: [phone]  [facility] Addiction Treatment Services: [phone]    [facility] HopeLine: 7-191-0-HOPENY    [facility] Office of Alcoholism and Substance Abuse Services (OASAS): https://www.oasas.[place].gov/providerdirectory/  [facility] for Addiction Services and Psychotherapy Interventions Research (CASPIR)  www.[facility].org     Interested in discussing options to reduce your tobacco use?    [facility] for Tobacco Control:  [phone]  [facility] QUITLINE: 2-313-EO-QUITS (462-9822)    Interested in learning more about substance use?      http://rethinkingdrinking.niaaa.nih.gov   https://www.drugabuse.gov/patients-families     Learn more about opioid overdose prevention programs in [facility]:  http://www.[facility].ny.[facility]/diseases/aids/general/opioid_overdose_prevention/
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
## 2022-07-21 DIAGNOSIS — Z59.00 HOMELESSNESS UNSPECIFIED: ICD-10-CM

## 2022-07-21 DIAGNOSIS — Z91.018 ALLERGY TO OTHER FOODS: ICD-10-CM

## 2022-07-21 DIAGNOSIS — R51.9 HEADACHE, UNSPECIFIED: ICD-10-CM

## 2022-07-21 DIAGNOSIS — F10.10 ALCOHOL ABUSE, UNCOMPLICATED: ICD-10-CM

## 2022-07-21 SDOH — ECONOMIC STABILITY - HOUSING INSECURITY: HOMELESSNESS UNSPECIFIED: Z59.00

## 2022-08-01 ENCOUNTER — EMERGENCY (EMERGENCY)
Facility: HOSPITAL | Age: 42
LOS: 1 days | Discharge: LEFT BEFORE DISCHARGE | End: 2022-08-01
Attending: EMERGENCY MEDICINE | Admitting: EMERGENCY MEDICINE

## 2022-08-01 VITALS
HEART RATE: 66 BPM | SYSTOLIC BLOOD PRESSURE: 131 MMHG | DIASTOLIC BLOOD PRESSURE: 86 MMHG | TEMPERATURE: 98 F | OXYGEN SATURATION: 97 % | RESPIRATION RATE: 18 BRPM

## 2022-08-01 VITALS
OXYGEN SATURATION: 96 % | SYSTOLIC BLOOD PRESSURE: 123 MMHG | TEMPERATURE: 98 F | WEIGHT: 175.05 LBS | HEART RATE: 89 BPM | HEIGHT: 65 IN | DIASTOLIC BLOOD PRESSURE: 88 MMHG | RESPIRATION RATE: 18 BRPM

## 2022-08-01 DIAGNOSIS — Z91.013 ALLERGY TO SEAFOOD: ICD-10-CM

## 2022-08-01 DIAGNOSIS — Z20.822 CONTACT WITH AND (SUSPECTED) EXPOSURE TO COVID-19: ICD-10-CM

## 2022-08-01 DIAGNOSIS — D64.9 ANEMIA, UNSPECIFIED: ICD-10-CM

## 2022-08-01 DIAGNOSIS — Z53.29 PROCEDURE AND TREATMENT NOT CARRIED OUT BECAUSE OF PATIENT'S DECISION FOR OTHER REASONS: ICD-10-CM

## 2022-08-01 DIAGNOSIS — R41.82 ALTERED MENTAL STATUS, UNSPECIFIED: ICD-10-CM

## 2022-08-01 LAB
ALBUMIN SERPL ELPH-MCNC: 3.1 G/DL — LOW (ref 3.4–5)
ALP SERPL-CCNC: 94 U/L — SIGNIFICANT CHANGE UP (ref 40–120)
ALT FLD-CCNC: 29 U/L — SIGNIFICANT CHANGE UP (ref 12–42)
ANION GAP SERPL CALC-SCNC: 11 MMOL/L — SIGNIFICANT CHANGE UP (ref 9–16)
AST SERPL-CCNC: 55 U/L — HIGH (ref 15–37)
BASOPHILS # BLD AUTO: 0.08 K/UL — SIGNIFICANT CHANGE UP (ref 0–0.2)
BASOPHILS NFR BLD AUTO: 1 % — SIGNIFICANT CHANGE UP (ref 0–2)
BILIRUB SERPL-MCNC: 0.6 MG/DL — SIGNIFICANT CHANGE UP (ref 0.2–1.2)
BUN SERPL-MCNC: 7 MG/DL — SIGNIFICANT CHANGE UP (ref 7–23)
CALCIUM SERPL-MCNC: 8.9 MG/DL — SIGNIFICANT CHANGE UP (ref 8.5–10.5)
CHLORIDE SERPL-SCNC: 108 MMOL/L — SIGNIFICANT CHANGE UP (ref 96–108)
CO2 SERPL-SCNC: 22 MMOL/L — SIGNIFICANT CHANGE UP (ref 22–31)
CREAT SERPL-MCNC: 0.84 MG/DL — SIGNIFICANT CHANGE UP (ref 0.5–1.3)
EGFR: 114 ML/MIN/1.73M2 — SIGNIFICANT CHANGE UP
EOSINOPHIL # BLD AUTO: 0.24 K/UL — SIGNIFICANT CHANGE UP (ref 0–0.5)
EOSINOPHIL NFR BLD AUTO: 3 % — SIGNIFICANT CHANGE UP (ref 0–6)
ETHANOL SERPL-MCNC: 15 MG/DL — HIGH
FLUAV AG NPH QL: SIGNIFICANT CHANGE UP
FLUBV AG NPH QL: SIGNIFICANT CHANGE UP
GLUCOSE SERPL-MCNC: 101 MG/DL — HIGH (ref 70–99)
HCT VFR BLD CALC: 23.4 % — LOW (ref 39–50)
HGB BLD-MCNC: 6.8 G/DL — CRITICAL LOW (ref 13–17)
HYPOCHROMIA BLD QL: SIGNIFICANT CHANGE UP
IMM GRANULOCYTES NFR BLD AUTO: 0.6 % — SIGNIFICANT CHANGE UP (ref 0–1.5)
LACTATE SERPL-SCNC: 1.7 MMOL/L — SIGNIFICANT CHANGE UP (ref 0.4–2)
LG PLATELETS BLD QL AUTO: SLIGHT — SIGNIFICANT CHANGE UP
LYMPHOCYTES # BLD AUTO: 1.49 K/UL — SIGNIFICANT CHANGE UP (ref 1–3.3)
LYMPHOCYTES # BLD AUTO: 18.7 % — SIGNIFICANT CHANGE UP (ref 13–44)
MACROCYTES BLD QL: SLIGHT — SIGNIFICANT CHANGE UP
MANUAL SMEAR VERIFICATION: SIGNIFICANT CHANGE UP
MCHC RBC-ENTMCNC: 22.4 PG — LOW (ref 27–34)
MCHC RBC-ENTMCNC: 29.1 GM/DL — LOW (ref 32–36)
MCV RBC AUTO: 77 FL — LOW (ref 80–100)
MICROCYTES BLD QL: SLIGHT — SIGNIFICANT CHANGE UP
MONOCYTES # BLD AUTO: 0.73 K/UL — SIGNIFICANT CHANGE UP (ref 0–0.9)
MONOCYTES NFR BLD AUTO: 9.1 % — SIGNIFICANT CHANGE UP (ref 2–14)
NEUTROPHILS # BLD AUTO: 5.39 K/UL — SIGNIFICANT CHANGE UP (ref 1.8–7.4)
NEUTROPHILS NFR BLD AUTO: 67.6 % — SIGNIFICANT CHANGE UP (ref 43–77)
NRBC # BLD: 0 /100 WBCS — SIGNIFICANT CHANGE UP (ref 0–0)
OB PNL STL: NEGATIVE — SIGNIFICANT CHANGE UP
PLAT MORPH BLD: ABNORMAL
PLATELET # BLD AUTO: 218 K/UL — SIGNIFICANT CHANGE UP (ref 150–400)
PLATELET CLUMP BLD QL SMEAR: ABNORMAL
PLATELET COUNT - ESTIMATE: ABNORMAL
POLYCHROMASIA BLD QL SMEAR: SLIGHT — SIGNIFICANT CHANGE UP
POTASSIUM SERPL-MCNC: 3.7 MMOL/L — SIGNIFICANT CHANGE UP (ref 3.5–5.3)
POTASSIUM SERPL-SCNC: 3.7 MMOL/L — SIGNIFICANT CHANGE UP (ref 3.5–5.3)
PROT SERPL-MCNC: 7 G/DL — SIGNIFICANT CHANGE UP (ref 6.4–8.2)
RBC # BLD: 3.04 M/UL — LOW (ref 4.2–5.8)
RBC # FLD: 21.6 % — HIGH (ref 10.3–14.5)
RBC BLD AUTO: ABNORMAL
RSV RNA NPH QL NAA+NON-PROBE: SIGNIFICANT CHANGE UP
SARS-COV-2 RNA SPEC QL NAA+PROBE: SIGNIFICANT CHANGE UP
SODIUM SERPL-SCNC: 141 MMOL/L — SIGNIFICANT CHANGE UP (ref 132–145)
WBC # BLD: 7.98 K/UL — SIGNIFICANT CHANGE UP (ref 3.8–10.5)
WBC # FLD AUTO: 7.98 K/UL — SIGNIFICANT CHANGE UP (ref 3.8–10.5)

## 2022-08-01 PROCEDURE — 99284 EMERGENCY DEPT VISIT MOD MDM: CPT

## 2022-08-01 PROCEDURE — 70450 CT HEAD/BRAIN W/O DYE: CPT | Mod: 26

## 2022-08-01 RX ORDER — ACETAMINOPHEN 500 MG
650 TABLET ORAL ONCE
Refills: 0 | Status: COMPLETED | OUTPATIENT
Start: 2022-08-01 | End: 2022-08-01

## 2022-08-01 RX ORDER — SODIUM CHLORIDE 9 MG/ML
1000 INJECTION INTRAMUSCULAR; INTRAVENOUS; SUBCUTANEOUS ONCE
Refills: 0 | Status: COMPLETED | OUTPATIENT
Start: 2022-08-01 | End: 2022-08-01

## 2022-08-01 RX ADMIN — Medication 650 MILLIGRAM(S): at 03:18

## 2022-08-01 RX ADMIN — SODIUM CHLORIDE 1000 MILLILITER(S): 9 INJECTION INTRAMUSCULAR; INTRAVENOUS; SUBCUTANEOUS at 03:18

## 2022-08-01 NOTE — ED ADULT NURSE NOTE - NSHOSCREENINGQ1_ED_ALL_ED
No Alert oriented x 4 admits to asher drinking last 3 days around 2L vodka a day. Pt complaining of pain to left abdomin radiating to left flank w. nausea and vomiting with urinary frequency.
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
## 2022-08-01 NOTE — ED PROVIDER NOTE - PATIENT PORTAL LINK FT
You can access the FollowMyHealth Patient Portal offered by [facility] by registering at the following website: [email or web address]. By joining [employer]’s FollowMyHealth portal, you will also be able to view your health information using other applications (apps) compatible with our system.

## 2022-08-01 NOTE — ED PROVIDER NOTE - ATTENDING APP SHARED VISIT CONTRIBUTION OF CARE
Pt presents for weakness, anemia. Pt was admitted at [facility] recently and required transfusions. AMA'd from the hospital. Today found to be anemic. <7. Will require admission for PRBCs. Calls attempted multiple times for admission since 6 am and no response from hospitalist service at [facility]. To be admitted in AM

## 2022-08-01 NOTE — ED ADULT NURSE NOTE - OBJECTIVE STATEMENT
40 y/o male who reports his " HgB is low", pt signed out against medical advice from [facility] yesterday after receiving blood transfusions- Pt denies abdominal pain, n/v/d/c tarry stool- admits to drinking alcohol and smoking weed.

## 2022-08-01 NOTE — ED ADULT TRIAGE NOTE - SOURCE OF INFORMATION
Telephone Encounter by Jayjay Jane at 06/19/17 10:19 AM     Author:  Jayjay Jane Service:  (none) Author Type:       Filed:  06/19/17 10:20 AM Encounter Date:  6/19/2017 Status:  Signed     :  Jayjay Jane (Patient )              HOLLI BEDOYA    Patient Age: 46 year old    ACCT STATUS:   MESSAGE:[JE1.1T] The patient needs the order for his MRI faxed to [facility] in [place].  Fax [phone][JE1.1M]     Next and Last Visit with Provider and Department  Next visit with MILY, SHILPA is on No match found  Next visit with [facility] is on No match found  Last visit with MILY, SHILPA was on 06/12/2017 at  4:00 PM in [facility] PL  Last visit with [facility] was on 06/12/2017 at  4:00 PM in [facility]     WEIGHT AND HEIGHT: As of 06/12/2017 weight is 245.8 lbs.(111.494 kg).   BMI is 37.58 kg/(m^2) calculated from:     Height 5' 7\" (1.702 m) as of 3/30/17     Weight 240 lb (108.863 kg) as of 3/30/17        CALL BACK INFO:[JE1.1T] Ok to leave response (including medical information) with family member or on answering machine[JE1.1M]  ROUTING:[JE1.1T] Patient's physician/staff[JE1.1M]        PCP: Vishal Gary MD         INS: Payor: [employer] PPO / Plan: *No Plan* / Product Type: *No Product type* / Note: This is the primary coverage, but no account was found for this location or the patient's primary location.   ADDRESS:  [address] [place] [id number][JE1.1T]         Revision History        User Key Date/Time User Provider Type Action    > JE1.1 06/19/17 10:20 AM Jayjay Jane Patient  Sign    M - Manual, T - Template             Patient

## 2022-08-01 NOTE — ED ADULT NURSE REASSESSMENT NOTE - NS ED NURSE REASSESS COMMENT FT1
pt requesting to leave against medical advice as he states he does not want to go to [facility]. FLORY Hong at bedside speaking with patient. AMA forms completed by patient. Risks explained to patient by FLORY Hong

## 2022-08-01 NOTE — ED PROVIDER NOTE - NS ED ATTENDING STATEMENT MOD
This was a shared visit with the JO ANN. I reviewed and verified the documentation and independently performed the documented:

## 2022-08-01 NOTE — ED PROVIDER NOTE - CLINICAL SUMMARY MEDICAL DECISION MAKING FREE TEXT BOX
42 yo M, h/o anemia, presenting to the ED c/o generalized weakness after eating food on the street yesterday. Patient also reports headache. No red flags on exam. Will send medical labs, obtain CT head, and give PO tylenol w/ IVF. Dispo pending clinical improvement and medical w/u.

## 2022-08-01 NOTE — ED PROVIDER NOTE - OBJECTIVE STATEMENT
42 yo M, h/o anemia, presenting to the ED c/o generalized weakness after eating food on the street yesterday. Pt states he has a generalized headache as well. Pt was in [facility] yesterday and states he was treated for anemia w/ blood transfusions. He reports leaving because he needed to  a check. Denies headaches, dizziness, changes in vision, back pain, CP, abd pain, or urinary symptoms. Denies any bleeding.

## 2022-08-01 NOTE — ED ADULT TRIAGE NOTE - CHIEF COMPLAINT QUOTE
Pt brought in by EMS stating "I feel very weak." Pt admits to drinking three beers and having 3 rolls of marijuana.

## 2023-03-11 NOTE — ED PROVIDER NOTE -                                                                                                                              SCRIBE ATTESTATION SECTION
You can access the FollowMyHealth Patient Portal offered by [facility] by registering at the following website: [email or web address]. By joining [employer]’s FollowMyHealth portal, you will also be able to view your health information using other applications (apps) compatible with our system. Statement Selected

## 2023-04-22 ENCOUNTER — EMERGENCY (EMERGENCY)
Facility: HOSPITAL | Age: 43
LOS: 1 days | Discharge: HOME | End: 2023-04-22
Attending: EMERGENCY MEDICINE | Admitting: EMERGENCY MEDICINE
Payer: MEDICAID

## 2023-04-22 VITALS
HEART RATE: 68 BPM | SYSTOLIC BLOOD PRESSURE: 106 MMHG | DIASTOLIC BLOOD PRESSURE: 65 MMHG | RESPIRATION RATE: 16 BRPM | OXYGEN SATURATION: 94 % | TEMPERATURE: 97 F | HEIGHT: 68 IN | WEIGHT: 179.9 LBS

## 2023-04-22 DIAGNOSIS — F10.129 ALCOHOL ABUSE WITH INTOXICATION, UNSPECIFIED: ICD-10-CM

## 2023-04-22 DIAGNOSIS — Z91.013 ALLERGY TO SEAFOOD: ICD-10-CM

## 2023-04-22 LAB — GLUCOSE BLDC GLUCOMTR-MCNC: 91 MG/DL — SIGNIFICANT CHANGE UP (ref 70–99)

## 2023-04-22 PROCEDURE — 99284 EMERGENCY DEPT VISIT MOD MDM: CPT

## 2023-04-22 NOTE — ED PROVIDER NOTE - OBJECTIVE STATEMENT
Patient brought in by EMS from [facility] for suspected alcohol intoxication without any report of fall/trauma/head injury, seizure, vomiting, or abnormal vital signs.  History and ROS limited due to current state.

## 2023-04-22 NOTE — ED PROVIDER NOTE - NSFOLLOWUPINSTRUCTIONS_ED_ALL_ED_FT
Alcohol intoxication occurs when a person no longer thinks clearly or functions well (becomes impaired) after drinking alcohol. Intoxication can occur with even one drink. The level of impairment depends on:    The amount of alcohol the person had.  The person's age, gender, and weight.  How often the person drinks.  Whether the person has other medical conditions, such as diabetes, seizures, or a heart condition.    Alcohol intoxication can range in severity from mild to severe. The condition can be dangerous, especially when caused by drinking large amounts of alcohol in a short period of time (binge drinking) or if the person also took certain prescription medicines or recreational drugs.    What are the signs or symptoms?  Symptoms of mild alcohol intoxication include:    Feeling relaxed or sleepy.  Mild difficulty with:  Coordination.  Speech.  Memory.  Attention.    Symptoms of moderate alcohol intoxication include:    Extreme emotions, such as anger or sadness.  Moderate difficulty with:  Coordination.  Speech.  Memory.  Attention.    Symptoms of severe alcohol intoxication include:    Passing out.  Vomiting.  Confusion.  Slow breathing.  Coma.  Severe difficulty with:  Coordination.  Speech.  Memory.  Attention.    Intoxication, especially in people who are not exposed to alcohol often can progress from mild to severe quickly, and may even cause coma or death.    How is this diagnosed?  This condition may be diagnosed based on:    A medical history.  A physical exam.  A blood test that measures the concentration of alcohol in the blood (blood alcohol content, or NICK).  Whether there is a smell of alcohol on the breath.    Your health care provider will ask you how much alcohol you drank and what kind of alcohol you had.    How is this treated?  Usually, treatment is not needed for this condition. Most of the effects of alcohol are temporary and go away as the alcohol naturally leaves the body. Your health care provider may recommend monitoring until the alcohol level starts to drop and it is safe to go home. You may also get fluids through an IV tube to help prevent dehydration. If the intoxication is severe, a breathing machine called a ventilator may be needed to support your breathing.    Follow these instructions at home:  Do not drive after drinking alcohol.  Have someone stay with you while you are intoxicated. You should not be left alone.  Stay hydrated. Drink enough fluid to keep your urine clear or pale yellow.  Avoid caffeine because it can dehydrate you.  Take over-the-counter and prescription medicines only as told by your health care provider.     How is this prevented?  To prevent alcohol intoxication:    Limit alcohol intake to no more than 1 drink a day for nonpregnant women and 2 drinks a day for men. One drink equals 12 oz of beer, 5 oz of wine, or 1½ oz of hard liquor.  Do not drink alcohol on an empty stomach.  Avoid drinking alcohol if:  You are under the legal drinking age.  You are pregnant or may be pregnant.  You are taking medicines that should not be taken with alcohol.  Your drinking causes your medical condition to get worse.  You need to drive or perform activities that require your attention.  You have substance use disorder.    To prevent potentially serious complications of alcohol intoxication, seek immediate medical care if you or someone you know has signs of moderate or severe alcohol intoxication. These include:    Moderate or severe difficulty with:  Coordination.  Speech.  Memory.  Attention.  Passing out.  Confusion.  Vomiting.    Do not leave someone alone if he or she is intoxicated.    Contact a health care provider if:  You do not feel better after a few days.  You are having problems at work, at school, or at home due to drinking.    Get help right away if:  You become shaky when you try to stop drinking.  You shake uncontrollably (have a seizure).  You vomit blood. Blood in vomit may look bright red, or it may look like coffee grounds.  You have blood in your stool. Blood in stool may be bright red, or it may make stool appear black and tarry and make it smell bad.  You become light-headed or you faint.    If you ever feel like you may hurt yourself or others, or have thoughts about taking your own life, get help right away. You can go to your nearest emergency department or call:    Your local emergency services (911 in the [place]  A suicide crisis helpline, such as the National Suicide Prevention Lifeline at 1-652.695.2725. This is open 24 hours a day.

## 2023-04-22 NOTE — ED PROVIDER NOTE - CLINICAL SUMMARY MEDICAL DECISION MAKING FREE TEXT BOX
Patient observed in the ED until clinically more sober (refer to progress note above).  Mental status improved and able to ambulate without difficulty.  Vital signs stable.  Patient discharged/escorted out by security due to threatening behavior.

## 2023-04-22 NOTE — ED ADULT TRIAGE NOTE - NS ED TRIAGE AVPU SCALE
Verbal - The patient responds to verbal stimuli by opening their eyes when someone speaks to them. The patient is not fully oriented to time, place, or person. I have personally seen and examined this patient.  I have fully participated in the care of this patient. I have reviewed all pertinent clinical information, including history, physical exam, plan and the Resident’s note and agree except as noted.
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
## 2023-04-22 NOTE — ED PROVIDER NOTE - PATIENT PORTAL LINK FT
You can access the FollowMyHealth Patient Portal offered by [facility] by registering at the following website: [email or web address]. By joining [email or web address]’s FollowMyHealth portal, you will also be able to view your health information using other applications (apps) compatible with our system.
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
## 2023-04-22 NOTE — ED PROVIDER NOTE - PHYSICAL EXAMINATION
[de-identified] : Has been social distancing in [place].  Doing remote teaching.  No nausea, vomiting, diarrhea, and constipation. No chest pain or shortness of breath.\par BP's checked at work, systolics 110-120's\par Active.  Lost 2 lbs.\par 7/2/20 and 7/9/20  Neg COVID PCR x 2 Gen:  Odor suggestive of ETOH on breath, appears unkempt  AVPU:  verbal  Skin:  warm, dry  HEENT:  no signs of head trauma, PERRL, airway patent  Neck:  supple  CV:  RRR without murmurs  Lungs:  CTAB  Abdomen:  soft, nontender, nondistended  MS:  No deformities  Neuro:  Withdraws and localizes to pain.  No facial asymmetry.  No focal deficits.  Follows basic commands.

## 2023-04-22 NOTE — ED ADULT NURSE NOTE - OBJECTIVE STATEMENT
pt bib ems, found in [place] station with a bottle of alcohol. resp even and unlabored, responds to voice but unoriented to situation or place.
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
## 2023-09-04 ENCOUNTER — EMERGENCY (EMERGENCY)
Facility: HOSPITAL | Age: 43
LOS: 1 days | Discharge: HOME | End: 2023-09-04
Attending: EMERGENCY MEDICINE | Admitting: EMERGENCY MEDICINE
Payer: MEDICAID

## 2023-09-04 VITALS
TEMPERATURE: 99 F | HEART RATE: 101 BPM | DIASTOLIC BLOOD PRESSURE: 91 MMHG | HEIGHT: 66 IN | OXYGEN SATURATION: 95 % | SYSTOLIC BLOOD PRESSURE: 140 MMHG | RESPIRATION RATE: 16 BRPM | WEIGHT: 190.04 LBS

## 2023-09-04 VITALS
OXYGEN SATURATION: 98 % | DIASTOLIC BLOOD PRESSURE: 87 MMHG | HEART RATE: 68 BPM | RESPIRATION RATE: 16 BRPM | SYSTOLIC BLOOD PRESSURE: 131 MMHG

## 2023-09-04 DIAGNOSIS — M79.10 MYALGIA, UNSPECIFIED SITE: ICD-10-CM

## 2023-09-04 DIAGNOSIS — Z20.822 CONTACT WITH AND (SUSPECTED) EXPOSURE TO COVID-19: ICD-10-CM

## 2023-09-04 DIAGNOSIS — Z86.59 PERSONAL HISTORY OF OTHER MENTAL AND BEHAVIORAL DISORDERS: ICD-10-CM

## 2023-09-04 DIAGNOSIS — Z91.013 ALLERGY TO SEAFOOD: ICD-10-CM

## 2023-09-04 LAB
ALBUMIN SERPL ELPH-MCNC: 3.6 G/DL — SIGNIFICANT CHANGE UP (ref 3.4–5)
ALP SERPL-CCNC: 119 U/L — SIGNIFICANT CHANGE UP (ref 40–120)
ALT FLD-CCNC: 43 U/L — HIGH (ref 12–42)
AMPHET UR-MCNC: POSITIVE
ANION GAP SERPL CALC-SCNC: 8 MMOL/L — LOW (ref 9–16)
APPEARANCE UR: CLEAR — SIGNIFICANT CHANGE UP
AST SERPL-CCNC: 42 U/L — HIGH (ref 15–37)
BARBITURATES UR SCN-MCNC: NEGATIVE — SIGNIFICANT CHANGE UP
BASOPHILS # BLD AUTO: 0.09 K/UL — SIGNIFICANT CHANGE UP (ref 0–0.2)
BASOPHILS NFR BLD AUTO: 0.6 % — SIGNIFICANT CHANGE UP (ref 0–2)
BENZODIAZ UR-MCNC: NEGATIVE — SIGNIFICANT CHANGE UP
BILIRUB SERPL-MCNC: 0.7 MG/DL — SIGNIFICANT CHANGE UP (ref 0.2–1.2)
BILIRUB UR-MCNC: NEGATIVE — SIGNIFICANT CHANGE UP
BUN SERPL-MCNC: 6 MG/DL — LOW (ref 7–23)
CALCIUM SERPL-MCNC: 8.8 MG/DL — SIGNIFICANT CHANGE UP (ref 8.5–10.5)
CHLORIDE SERPL-SCNC: 103 MMOL/L — SIGNIFICANT CHANGE UP (ref 96–108)
CO2 SERPL-SCNC: 26 MMOL/L — SIGNIFICANT CHANGE UP (ref 22–31)
COCAINE METAB.OTHER UR-MCNC: POSITIVE
COLOR SPEC: YELLOW — SIGNIFICANT CHANGE UP
CREAT SERPL-MCNC: 0.5 MG/DL — SIGNIFICANT CHANGE UP (ref 0.5–1.3)
DIFF PNL FLD: NEGATIVE — SIGNIFICANT CHANGE UP
EGFR: 132 ML/MIN/1.73M2 — SIGNIFICANT CHANGE UP
EOSINOPHIL # BLD AUTO: 0.13 K/UL — SIGNIFICANT CHANGE UP (ref 0–0.5)
EOSINOPHIL NFR BLD AUTO: 0.9 % — SIGNIFICANT CHANGE UP (ref 0–6)
FENTANYL UR QL SCN: NEGATIVE — SIGNIFICANT CHANGE UP
FLUAV AG NPH QL: SIGNIFICANT CHANGE UP
FLUBV AG NPH QL: SIGNIFICANT CHANGE UP
GLUCOSE SERPL-MCNC: 79 MG/DL — SIGNIFICANT CHANGE UP (ref 70–99)
GLUCOSE UR QL: NEGATIVE MG/DL — SIGNIFICANT CHANGE UP
HCT VFR BLD CALC: 29.9 % — LOW (ref 39–50)
HGB BLD-MCNC: 8.8 G/DL — LOW (ref 13–17)
HIV 1 & 2 AB SERPL IA.RAPID: SIGNIFICANT CHANGE UP
HYPOCHROMIA BLD QL: SIGNIFICANT CHANGE UP
IMM GRANULOCYTES NFR BLD AUTO: 0.5 % — SIGNIFICANT CHANGE UP (ref 0–0.9)
KETONES UR-MCNC: NEGATIVE MG/DL — SIGNIFICANT CHANGE UP
LEUKOCYTE ESTERASE UR-ACNC: NEGATIVE — SIGNIFICANT CHANGE UP
LG PLATELETS BLD QL AUTO: SLIGHT — SIGNIFICANT CHANGE UP
LYMPHOCYTES # BLD AUTO: 1.53 K/UL — SIGNIFICANT CHANGE UP (ref 1–3.3)
LYMPHOCYTES # BLD AUTO: 10.9 % — LOW (ref 13–44)
MACROCYTES BLD QL: SLIGHT — SIGNIFICANT CHANGE UP
MANUAL SMEAR VERIFICATION: SIGNIFICANT CHANGE UP
MCHC RBC-ENTMCNC: 21.4 PG — LOW (ref 27–34)
MCHC RBC-ENTMCNC: 29.4 GM/DL — LOW (ref 32–36)
MCV RBC AUTO: 72.7 FL — LOW (ref 80–100)
METHADONE UR-MCNC: NEGATIVE — SIGNIFICANT CHANGE UP
MICROCYTES BLD QL: SLIGHT — SIGNIFICANT CHANGE UP
MONOCYTES # BLD AUTO: 1.05 K/UL — HIGH (ref 0–0.9)
MONOCYTES NFR BLD AUTO: 7.5 % — SIGNIFICANT CHANGE UP (ref 2–14)
NEUTROPHILS # BLD AUTO: 11.12 K/UL — HIGH (ref 1.8–7.4)
NEUTROPHILS NFR BLD AUTO: 79.6 % — HIGH (ref 43–77)
NITRITE UR-MCNC: NEGATIVE — SIGNIFICANT CHANGE UP
NRBC # BLD: 0 /100 WBCS — SIGNIFICANT CHANGE UP (ref 0–0)
OPIATES UR-MCNC: NEGATIVE — SIGNIFICANT CHANGE UP
PCP SPEC-MCNC: SIGNIFICANT CHANGE UP
PCP UR-MCNC: NEGATIVE — SIGNIFICANT CHANGE UP
PH UR: 6 — SIGNIFICANT CHANGE UP (ref 5–8)
PLAT MORPH BLD: ABNORMAL
PLATELET # BLD AUTO: 262 K/UL — SIGNIFICANT CHANGE UP (ref 150–400)
POLYCHROMASIA BLD QL SMEAR: SLIGHT — SIGNIFICANT CHANGE UP
POTASSIUM SERPL-MCNC: 3.2 MMOL/L — LOW (ref 3.5–5.3)
POTASSIUM SERPL-SCNC: 3.2 MMOL/L — LOW (ref 3.5–5.3)
PROT SERPL-MCNC: 7.5 G/DL — SIGNIFICANT CHANGE UP (ref 6.4–8.2)
PROT UR-MCNC: NEGATIVE MG/DL — SIGNIFICANT CHANGE UP
RBC # BLD: 4.11 M/UL — LOW (ref 4.2–5.8)
RBC # FLD: 17.9 % — HIGH (ref 10.3–14.5)
RBC BLD AUTO: ABNORMAL
RSV RNA NPH QL NAA+NON-PROBE: SIGNIFICANT CHANGE UP
SARS-COV-2 RNA SPEC QL NAA+PROBE: SIGNIFICANT CHANGE UP
SODIUM SERPL-SCNC: 137 MMOL/L — SIGNIFICANT CHANGE UP (ref 132–145)
SP GR SPEC: 1.02 — SIGNIFICANT CHANGE UP (ref 1–1.03)
THC UR QL: POSITIVE
UROBILINOGEN FLD QL: 1 MG/DL — SIGNIFICANT CHANGE UP (ref 0.2–1)
WBC # BLD: 13.99 K/UL — HIGH (ref 3.8–10.5)
WBC # FLD AUTO: 13.99 K/UL — HIGH (ref 3.8–10.5)

## 2023-09-04 PROCEDURE — 99284 EMERGENCY DEPT VISIT MOD MDM: CPT

## 2023-09-04 RX ORDER — SODIUM CHLORIDE 9 MG/ML
2000 INJECTION INTRAMUSCULAR; INTRAVENOUS; SUBCUTANEOUS ONCE
Refills: 0 | Status: COMPLETED | OUTPATIENT
Start: 2023-09-04 | End: 2023-09-04

## 2023-09-04 RX ORDER — ACETAMINOPHEN 500 MG
650 TABLET ORAL ONCE
Refills: 0 | Status: COMPLETED | OUTPATIENT
Start: 2023-09-04 | End: 2023-09-04

## 2023-09-04 RX ORDER — KETOROLAC TROMETHAMINE 30 MG/ML
15 SYRINGE (ML) INJECTION ONCE
Refills: 0 | Status: DISCONTINUED | OUTPATIENT
Start: 2023-09-04 | End: 2023-09-04

## 2023-09-04 RX ADMIN — SODIUM CHLORIDE 2000 MILLILITER(S): 9 INJECTION INTRAMUSCULAR; INTRAVENOUS; SUBCUTANEOUS at 03:18

## 2023-09-04 RX ADMIN — Medication 650 MILLIGRAM(S): at 03:18

## 2023-09-04 RX ADMIN — Medication 1 MILLIGRAM(S): at 03:18

## 2023-09-04 RX ADMIN — Medication 15 MILLIGRAM(S): at 03:18

## 2023-09-04 NOTE — ED ADULT NURSE NOTE - OBJECTIVE STATEMENT
pt c/o of body aches, subjective fever and chills x 2days. pt further stated has used illicit drugs for along time.

## 2023-09-04 NOTE — ED ADULT NURSE NOTE - NSSEPSISSUSPECTED_ED_A_ED
Positive urine culture.  VO received from Dr. Saunders Augmentin bid x 7 days and diflucan x 1.      Patient verbalizes understanding of result and states he is at pharmacy now picking up antibiotic prescribed at ER over the weekend.  Writer explained he will need additional therapy as yeast was also found in urine.  Call was disconnected.  Will attempt later.      Prescriptions sent to [facility].    
[Follow-Up: _____] : a [unfilled] follow-up visit
No

## 2023-09-04 NOTE — ED PROVIDER NOTE - OBJECTIVE STATEMENT
42M history of drug use  4d of body aches, chills. Last smoked crack 1hr pta. Denies cough, congestion, chest pain, shortness of breath, abdominal pain, bowel/bladder habit change.

## 2023-09-04 NOTE — ED PROVIDER NOTE - PHYSICAL EXAMINATION
PE: patient very anxious with increased psychomotor agitation, unkempt, RRR, CTAB, MMM, abd soft ntnd, no visible rash

## 2023-09-04 NOTE — ED PROVIDER NOTE - CLINICAL SUMMARY MEDICAL DECISION MAKING FREE TEXT BOX
42M history of drug use  4d of body aches, chills. Last smoked crack 1hr pta. Denies cough, congestion, chest pain, shortness of breath, abdominal pain, bowel/bladder habit change.     PE: patient very anxious with increased psychomotor agitation, unkempt, RRR, CTAB, MMM, abd soft ntnd, no visible rash    MDM: Patient with flu-like symptoms for 4d and recent drug use. Will eval with labs, ekg, and treat symptoms. Triage VS s/f low grade fever and tachycardia.

## 2023-09-04 NOTE — ED ADULT NURSE NOTE - NSFALLUNIVINTERV_ED_ALL_ED
Bed/Stretcher in lowest position, wheels locked, appropriate side rails in place/Call bell, personal items and telephone in reach/Instruct patient to call for assistance before getting out of bed/chair/stretcher/Non-slip footwear applied when patient is off stretcher/[place] to call system/Physically safe environment - no spills, clutter or unnecessary equipment/Purposeful proactive rounding/Room/bathroom lighting operational, light cord in reach

## 2023-09-04 NOTE — ED PROVIDER NOTE - NSFOLLOWUPINSTRUCTIONS_ED_ALL_ED_FT
DETOX/REHAB PROGRAMS:    [facility] Short Term Free Detox [place] - [address], [address] [facility] [address]    [facility]ox [facility] Rehab Unit - [address], [place] [address] ([phone])           PLEASE FOLLOW-UP WITH YOUR PRIMARY CARE DOCTOR IN 1-2 DAYS FOR FURTHER EVALUATION.      PLEASE TAKE ALL PAPERWORK FROM TODAY'S VISIT TO YOUR PRIMARY DOCTOR.     IF YOU DO NOT HAVE A PRIMARY CARE DOCTOR PLEASE REFER TO THE OFFICE/CLINIC INFORMATION GIVEN BELOW:    If you do not have a doctor, you can call our referral line to find a doctor that matches your insurance; the number is [phone].     You can also follow up with clinics listed below, if you do not have a doctor:  [address] [address]  To make an appointment, call [phone]    [facility]  Address: [address] [place] [address]  Appointment Center: [phone] ([phone])     PLEASE RETURN TO THE ER IMMEDIATELY OR CALL [phone] ANY HIGH FEVER, CHEST PAIN, TROUBLE BREATHING, VOMITING, SEVERE PAIN, OR ANY OTHER CONCERNS.

## 2023-09-04 NOTE — ED PROVIDER NOTE - PATIENT PORTAL LINK FT
You can access the FollowMyHealth Patient Portal offered by [facility] by registering at the following website: [email or web address]. By joining [employer]’s FollowMyHealth portal, you will also be able to view your health information using other applications (apps) compatible with our system.

## 2023-09-26 NOTE — ED PROVIDER NOTE - INTERNATIONAL TRAVEL
Your labs are stable your cholesterol is good level as well as your blood pressure your renal function is stable    Remind the rheumatologist to evaluate your DEXA scan which I will give you a copy and specially you are on prednisone to put you on medication to avoid further osteoporosis    We gave you the flu vaccine today    2 to 4 weeks later get your COVID-19 booster from your local pharmacy Unable to Assess

## 2023-10-05 ENCOUNTER — INPATIENT (INPATIENT)
Facility: HOSPITAL | Age: 43
LOS: 5 days | Discharge: HOME | DRG: 753 | End: 2023-10-11
Attending: PSYCHIATRY & NEUROLOGY | Admitting: PSYCHIATRY & NEUROLOGY
Payer: MEDICAID

## 2023-10-05 VITALS
RESPIRATION RATE: 16 BRPM | SYSTOLIC BLOOD PRESSURE: 166 MMHG | TEMPERATURE: 99 F | WEIGHT: 205.03 LBS | DIASTOLIC BLOOD PRESSURE: 88 MMHG | OXYGEN SATURATION: 99 % | HEART RATE: 92 BPM

## 2023-10-05 NOTE — ED ADULT TRIAGE NOTE - CHIEF COMPLAINT QUOTE
pt BIBA for etoh, crack, and fentanyl use. pt tried to kill himself and said he feels "a lil' bit homicidal" charge rn made aware and 1 to 1 initiated in triage

## 2023-10-06 DIAGNOSIS — F31.9 BIPOLAR DISORDER, UNSPECIFIED: ICD-10-CM

## 2023-10-06 DIAGNOSIS — F32.A DEPRESSION, UNSPECIFIED: ICD-10-CM

## 2023-10-06 LAB
ALBUMIN SERPL ELPH-MCNC: 4.3 G/DL — SIGNIFICANT CHANGE UP (ref 3.5–5.2)
ALP SERPL-CCNC: 130 U/L — HIGH (ref 30–115)
ALT FLD-CCNC: 84 U/L — HIGH (ref 0–41)
ANION GAP SERPL CALC-SCNC: 13 MMOL/L — SIGNIFICANT CHANGE UP (ref 7–14)
APAP SERPL-MCNC: <5 UG/ML — LOW (ref 10–30)
APPEARANCE UR: CLEAR — SIGNIFICANT CHANGE UP
AST SERPL-CCNC: 102 U/L — HIGH (ref 0–41)
BASOPHILS # BLD AUTO: 0.13 K/UL — SIGNIFICANT CHANGE UP (ref 0–0.2)
BASOPHILS NFR BLD AUTO: 2 % — HIGH (ref 0–1)
BILIRUB SERPL-MCNC: 0.2 MG/DL — SIGNIFICANT CHANGE UP (ref 0.2–1.2)
BILIRUB UR-MCNC: NEGATIVE — SIGNIFICANT CHANGE UP
BUN SERPL-MCNC: 11 MG/DL — SIGNIFICANT CHANGE UP (ref 10–20)
CALCIUM SERPL-MCNC: 9.1 MG/DL — SIGNIFICANT CHANGE UP (ref 8.4–10.5)
CHLORIDE SERPL-SCNC: 105 MMOL/L — SIGNIFICANT CHANGE UP (ref 98–110)
CO2 SERPL-SCNC: 23 MMOL/L — SIGNIFICANT CHANGE UP (ref 17–32)
COLOR SPEC: YELLOW — SIGNIFICANT CHANGE UP
CREAT SERPL-MCNC: 0.6 MG/DL — LOW (ref 0.7–1.5)
DIFF PNL FLD: NEGATIVE — SIGNIFICANT CHANGE UP
EGFR: 125 ML/MIN/1.73M2 — SIGNIFICANT CHANGE UP
EOSINOPHIL # BLD AUTO: 0.28 K/UL — SIGNIFICANT CHANGE UP (ref 0–0.7)
EOSINOPHIL NFR BLD AUTO: 4.2 % — SIGNIFICANT CHANGE UP (ref 0–8)
ETHANOL SERPL-MCNC: <10 MG/DL — SIGNIFICANT CHANGE UP
GLUCOSE SERPL-MCNC: 107 MG/DL — HIGH (ref 70–99)
GLUCOSE UR QL: NEGATIVE MG/DL — SIGNIFICANT CHANGE UP
HCT VFR BLD CALC: 32.9 % — LOW (ref 42–52)
HGB BLD-MCNC: 9.3 G/DL — LOW (ref 14–18)
HIV 1+2 AB+HIV1 P24 AG SERPL QL IA: SIGNIFICANT CHANGE UP
IMM GRANULOCYTES NFR BLD AUTO: 0.3 % — SIGNIFICANT CHANGE UP (ref 0.1–0.3)
KETONES UR-MCNC: NEGATIVE MG/DL — SIGNIFICANT CHANGE UP
LEUKOCYTE ESTERASE UR-ACNC: NEGATIVE — SIGNIFICANT CHANGE UP
LYMPHOCYTES # BLD AUTO: 1.83 K/UL — SIGNIFICANT CHANGE UP (ref 1.2–3.4)
LYMPHOCYTES # BLD AUTO: 27.8 % — SIGNIFICANT CHANGE UP (ref 20.5–51.1)
MCHC RBC-ENTMCNC: 21.4 PG — LOW (ref 27–31)
MCHC RBC-ENTMCNC: 28.3 G/DL — LOW (ref 32–37)
MCV RBC AUTO: 75.6 FL — LOW (ref 80–94)
MONOCYTES # BLD AUTO: 0.7 K/UL — HIGH (ref 0.1–0.6)
MONOCYTES NFR BLD AUTO: 10.6 % — HIGH (ref 1.7–9.3)
NEUTROPHILS # BLD AUTO: 3.63 K/UL — SIGNIFICANT CHANGE UP (ref 1.4–6.5)
NEUTROPHILS NFR BLD AUTO: 55.1 % — SIGNIFICANT CHANGE UP (ref 42.2–75.2)
NITRITE UR-MCNC: NEGATIVE — SIGNIFICANT CHANGE UP
NRBC # BLD: 0 /100 WBCS — SIGNIFICANT CHANGE UP (ref 0–0)
PH UR: 7.5 — SIGNIFICANT CHANGE UP (ref 5–8)
PLATELET # BLD AUTO: 200 K/UL — SIGNIFICANT CHANGE UP (ref 130–400)
PMV BLD: 11 FL — HIGH (ref 7.4–10.4)
POTASSIUM SERPL-MCNC: 3.9 MMOL/L — SIGNIFICANT CHANGE UP (ref 3.5–5)
POTASSIUM SERPL-SCNC: 3.9 MMOL/L — SIGNIFICANT CHANGE UP (ref 3.5–5)
PROT SERPL-MCNC: 7.1 G/DL — SIGNIFICANT CHANGE UP (ref 6–8)
PROT UR-MCNC: NEGATIVE MG/DL — SIGNIFICANT CHANGE UP
RBC # BLD: 4.35 M/UL — LOW (ref 4.7–6.1)
RBC # FLD: 18.7 % — HIGH (ref 11.5–14.5)
SALICYLATES SERPL-MCNC: <0.3 MG/DL — LOW (ref 4–30)
SARS-COV-2 RNA SPEC QL NAA+PROBE: SIGNIFICANT CHANGE UP
SODIUM SERPL-SCNC: 141 MMOL/L — SIGNIFICANT CHANGE UP (ref 135–146)
SP GR SPEC: 1.01 — SIGNIFICANT CHANGE UP (ref 1–1.03)
UROBILINOGEN FLD QL: 0.2 MG/DL — SIGNIFICANT CHANGE UP (ref 0.2–1)
WBC # BLD: 6.59 K/UL — SIGNIFICANT CHANGE UP (ref 4.8–10.8)
WBC # FLD AUTO: 6.59 K/UL — SIGNIFICANT CHANGE UP (ref 4.8–10.8)

## 2023-10-06 RX ORDER — SODIUM CHLORIDE 9 MG/ML
1000 INJECTION, SOLUTION INTRAVENOUS ONCE
Refills: 0 | Status: COMPLETED | OUTPATIENT
Start: 2023-10-06 | End: 2023-10-06

## 2023-10-06 RX ORDER — ACETAMINOPHEN 500 MG
975 TABLET ORAL ONCE
Refills: 0 | Status: COMPLETED | OUTPATIENT
Start: 2023-10-06 | End: 2023-10-06

## 2023-10-06 RX ORDER — DIPHENHYDRAMINE HCL 50 MG
50 CAPSULE ORAL EVERY 6 HOURS
Refills: 0 | Status: DISCONTINUED | OUTPATIENT
Start: 2023-10-06 | End: 2023-10-07

## 2023-10-06 RX ORDER — ONDANSETRON 8 MG/1
4 TABLET, FILM COATED ORAL ONCE
Refills: 0 | Status: DISCONTINUED | OUTPATIENT
Start: 2023-10-06 | End: 2023-10-06

## 2023-10-06 RX ORDER — HALOPERIDOL DECANOATE 100 MG/ML
5 INJECTION INTRAMUSCULAR EVERY 6 HOURS
Refills: 0 | Status: DISCONTINUED | OUTPATIENT
Start: 2023-10-06 | End: 2023-10-07

## 2023-10-06 RX ADMIN — SODIUM CHLORIDE 1000 MILLILITER(S): 9 INJECTION, SOLUTION INTRAVENOUS at 04:01

## 2023-10-06 RX ADMIN — Medication 975 MILLIGRAM(S): at 04:01

## 2023-10-06 NOTE — ED ADULT NURSE NOTE - NSFALLUNIVINTERV_ED_ALL_ED
Bed/Stretcher in lowest position, wheels locked, appropriate side rails in place/Call bell, personal items and telephone in reach/Instruct patient to call for assistance before getting out of bed/chair/stretcher/Non-slip footwear applied when patient is off stretcher/[place] to call system/Physically safe environment - no spills, clutter or unnecessary equipment/Purposeful proactive rounding/Room/bathroom lighting operational, light cord in reach

## 2023-10-06 NOTE — ED BEHAVIORAL HEALTH ASSESSMENT NOTE - DESCRIPTION
irritable and somewhat uncooperative but has not required any PRN for agitation per HPI unknown - pt does not know
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
## 2023-10-06 NOTE — ED ADULT NURSE REASSESSMENT NOTE - NS ED NURSE REASSESS COMMENT FT1
pt received from previous RN awake and alert. No resp distress noted, equal chest rise. No further requests from patient at this time.

## 2023-10-06 NOTE — ED BEHAVIORAL HEALTH NOTE - BEHAVIORAL HEALTH NOTE
==================  PRE-HOSPITAL COURSE  ===================  SOURCE:  ELEN Dias and secondhand ED documentation  DETAILS: BIB EMS for intoxication and HI  =========  ED COURSE  =========  SOURCE:  ELEN Dias and secondhand ED documentation.  ARRIVAL:  Per chart and RN, patient arrived via EMS. Per RN, patient was calm upon arrival, and cooperative with triage process.  BELONGINGS:  Per RN, patient arrived with belongings. All belongings were provided to hospital security, and patient currently in a gown with a 1:1 staff member.  BEHAVIOR: RN described patient to be currently resting, cooperative, intermittently agitated but redirectable, demanding food, presenting with linear thought process, AAOx3, presenting with irritable mood and congruent affect, remains in behavioral and impulse control, is not violent/aggressive. RN stated that the patient is endorsing SI/HI/AH. RN stated that there are no visible marks, bruises, or lacerations on the body. RN stated that the patient appears to be disheveled, maintains poor hygiene.  TREATMENT:  Per chart and RN, patient did not receive PRN medications.   VISITORS:  Per RN, no visitors at bedside.        COVID Exposure Screen- collateral (i.e. third-party, chart review, belongings, etc; include EMS and ED staff)   ---------------------------------------------------  1. Has the patient had a COVID-19 test in the last 90 days? Unknown.   2. Has the patient tested positive for COVID-19 antibodies? Unknown.   3. Has the patient received 2 doses of the COVID-19 vaccine? Unknown  4. In the past 10 days, has the patient been around anyone with a positive COVID-19 test?* Unknown.   5. Has the patient been out of [place] within the past 10 days? Unknown

## 2023-10-06 NOTE — ED BEHAVIORAL HEALTH ASSESSMENT NOTE - NSBHSUBSTUSESTATEMENT_PSY_A_CORE
Pt tested positive for covid Nov 23. She would like a note to return to work. She has a note to return to work from [employer], but her employer wants one from her dr.  Her symptoms started on Nov 19. She got the test done at the [facility]. She has no symptoms now. Never had a fever or cough. .

## 2023-10-06 NOTE — ED BEHAVIORAL HEALTH ASSESSMENT NOTE - SUBSTANCE ISSUES AND PLAN (INCLUDE STANDING AND PRN MEDICATION)
continue to monitor VS and for signs/symptoms of etoh and opiate withdrawal. Will treat with PRN benzos as needed for now.
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
## 2023-10-06 NOTE — ED PROVIDER NOTE - PROGRESS NOTE DETAILS
Call placed to [facility]-- call placed to telepsych for update, they report patient is on their list but have no estimate on when he will be seen Pt s/o to Dr. Otero to follow up telepsychiatry, reassess and dispo. Note authored by Dr. Otero: Signed out to me by Dr. Aquino at 7:15 AM.  Patient patient pending psychiatric evaluation.  History of polysubstance abuse bipolar disorder.  Questionable SI.  Head CT was ordered and reviewed by me.  No evidence of acute traumatic injury.  Labs ordered and reviewed by me.  EKG was ordered and reviewed by me. SS Received s/o from  Over. Patient without acute distress. Pending telepsych. 1:1 at bedside Note authored by Dr. Otero: Currently being evaluated by psychiatry telemetry psychiatry Note authored by Dr. Otero: Seen by psych.  Inpatient psychiatric admission advised.  Involuntary.

## 2023-10-06 NOTE — ED BEHAVIORAL HEALTH ASSESSMENT NOTE - NSBHSAALC_PSY_A_CORE FT
reports drinking 4L vodka last night (report and precise quantity not fully reliable). Poor historian regarding pattern of drinking.
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
## 2023-10-06 NOTE — ED PROVIDER NOTE - CLINICAL SUMMARY MEDICAL DECISION MAKING FREE TEXT BOX
Note authored by Dr. Otero: Signed out to me by Dr. Aquino at 7:15 AM.  Patient patient pending psychiatric evaluation.  History of polysubstance abuse bipolar disorder.  Questionable SI.  Head CT was ordered and reviewed by me.  No evidence of acute traumatic injury.  Labs ordered and reviewed by me.  EKG was ordered and reviewed by me.

## 2023-10-06 NOTE — ED PROVIDER NOTE - ATTENDING CONTRIBUTION TO CARE
42-year-old male presents for evaluation of suicidal ideation.  Patient states he was drinking alcohol and using crack and fentanyl and is feeling awful about himself.  States he has been having increasing thoughts of self-harm as well as occasional homicidal ideation.  Patient did not elaborate further on HI.  States when he was intoxicated earlier he may have fallen complains of some right ear pain.  Denies any headache, vomiting, dizziness, chest pain, shortness of breath, abdominal pain or back pain.    VITAL SIGNS: noted  CONSTITUTIONAL: Well-developed; well-nourished; in no acute distress, disheveled  HEAD: Normocephalic; atraumatic  EYES: PERRL, EOM intact; conjunctiva and sclera clear  ENT: No nasal discharge; airway clear. MMM  NECK: Supple; non tender.    CARD: S1, S2 normal; no murmurs, gallops, or rubs. Regular rate and rhythm  RESP: CTAB/L, no wheezes, rales or rhonchi  ABD: Normal bowel sounds; soft; non-distended; non-tender; no CVA tenderness  EXT: Normal ROM. No calf tenderness or edema. Distal pulses intact  NEURO: Alert, oriented. Grossly unremarkable. No focal deficits  SKIN: Skin exam is warm and dry, no acute rash  MS: No midline spinal tenderness   PSYCH: calm and cooperative
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
## 2023-10-06 NOTE — ED PROVIDER NOTE - OBJECTIVE STATEMENT
42yoM PMHx Polysubstance abuse(etoh, crack, opiates) SI/HI presenting for SI/HI and diffuse myalgias, 42yoM PMHx Polysubstance abuse(etoh, crack, opiates), bipolar disorder (previously on medications, previously hospitalized) presenting for SI/HI and diffuse myalgias. Pt reports he and some friends used multiple drugs and drank alcohol last night, unsure how much, and then he woke up in the emergency room, unsure how he got here. Patient states he feels suicidal and homicidal "all the time" but denies any specific plan. Currently complains of pain everywhere and mild nausea, denies hallucinations.

## 2023-10-06 NOTE — ED PROVIDER NOTE - CADM POA URETHRAL CATHETER
Pharmacy Note  Warfarin Consult follow-up      Recent Labs     05/11/21  0700   INR 2.1     Recent Labs     05/10/21  0626   HGB 8.7*   HCT 28.4*        Current warfarin drug-drug interactions: acetaminophen    Date INR Dose   5/4/21 1.3 7.5 mg    5/5/21 1.1 5 mg    5/6/21 1.3 7.5 mg    5/7/21 1.4 10 mg    5/8/21 2.3 2.5mg   5/9/21 2.7 HOLD   5/10/2021 2.5 7.5mg   5/11/2021 2.1 7.5mg     Notes:   Give warfarin 7.5mg today on 5/11/2021. Daily PT/INR while inpatient.       Jayda Duque, [facility], CACP  Clinical Pharmacist Medication Management  5/11/2021  11:42 AM No

## 2023-10-06 NOTE — ED PROVIDER NOTE - PHYSICAL EXAMINATION
CONSTITUTIONAL: NAD  SKIN: Warm, dry  HEAD: NCAT  EYES: Clear conjunctiva   ENT: MMM  NECK: Supple  CARD: RRR, S1, S2; no M/R/G  RESP: Normal respiratory effort, CTAB  ABD: Soft, nontender. No rebound, rigidity, or guarding  EXT: Pulses palpable distally  NEURO: Grossly intact. Awake, alert, moving all extremities, no facial asymmetry.

## 2023-10-06 NOTE — ED BEHAVIORAL HEALTH ASSESSMENT NOTE - VIOLENCE RISK FACTORS:
Antisocial behavior/cognition (past or present)/Violent ideation/threat/speech/Substance abuse/Affective dysregulation/Impulsivity/History of being victimized/traumatized/Noncompliance with treatment/Community stressors that increase the risk of destabilization/Irritability

## 2023-10-06 NOTE — ED BEHAVIORAL HEALTH ASSESSMENT NOTE - DIFFERENTIAL
Unspecified depressive disorder  r/o antisocial personality disorder  opiate use disorder, alcohol use disorder, cocaine use disorder

## 2023-10-07 LAB
CK SERPL-CCNC: 60 U/L — SIGNIFICANT CHANGE UP (ref 0–225)
IRON SATN MFR SERPL: 15 UG/DL — LOW (ref 35–150)
IRON SATN MFR SERPL: 3 % — LOW (ref 15–50)
RBC # BLD: 4.92 M/UL — SIGNIFICANT CHANGE UP (ref 4.7–6.1)
RETICS #: 52.2 K/UL — SIGNIFICANT CHANGE UP (ref 25–125)
RETICS/RBC NFR: 1.1 % — SIGNIFICANT CHANGE UP (ref 0.5–1.5)
TIBC SERPL-MCNC: 536 UG/DL — HIGH (ref 220–430)
TRANSFERRIN SERPL-MCNC: 413 MG/DL — HIGH (ref 200–360)
TSH SERPL-MCNC: 0.88 UIU/ML — SIGNIFICANT CHANGE UP (ref 0.27–4.2)
UIBC SERPL-MCNC: 521 UG/DL — HIGH (ref 110–370)

## 2023-10-07 RX ORDER — HALOPERIDOL DECANOATE 100 MG/ML
10 INJECTION INTRAMUSCULAR ONCE
Refills: 0 | Status: DISCONTINUED | OUTPATIENT
Start: 2023-10-07 | End: 2023-10-07

## 2023-10-07 RX ORDER — HALOPERIDOL DECANOATE 100 MG/ML
5 INJECTION INTRAMUSCULAR EVERY 6 HOURS
Refills: 0 | Status: DISCONTINUED | OUTPATIENT
Start: 2023-10-07 | End: 2023-10-08

## 2023-10-07 RX ORDER — DIPHENHYDRAMINE HCL 50 MG
50 CAPSULE ORAL EVERY 6 HOURS
Refills: 0 | Status: DISCONTINUED | OUTPATIENT
Start: 2023-10-07 | End: 2023-10-07

## 2023-10-07 RX ORDER — DIPHENHYDRAMINE HCL 50 MG
50 CAPSULE ORAL EVERY 6 HOURS
Refills: 0 | Status: DISCONTINUED | OUTPATIENT
Start: 2023-10-07 | End: 2023-10-11

## 2023-10-07 RX ORDER — HALOPERIDOL DECANOATE 100 MG/ML
10 INJECTION INTRAMUSCULAR ONCE
Refills: 0 | Status: COMPLETED | OUTPATIENT
Start: 2023-10-07 | End: 2023-10-07

## 2023-10-07 RX ORDER — HALOPERIDOL DECANOATE 100 MG/ML
10 INJECTION INTRAMUSCULAR
Refills: 0 | Status: DISCONTINUED | OUTPATIENT
Start: 2023-10-07 | End: 2023-10-11

## 2023-10-07 RX ORDER — DIPHENHYDRAMINE HCL 50 MG
50 CAPSULE ORAL ONCE
Refills: 0 | Status: DISCONTINUED | OUTPATIENT
Start: 2023-10-07 | End: 2023-10-10

## 2023-10-07 RX ADMIN — HALOPERIDOL DECANOATE 10 MILLIGRAM(S): 100 INJECTION INTRAMUSCULAR at 18:34

## 2023-10-07 RX ADMIN — Medication 2 MILLIGRAM(S): at 18:27

## 2023-10-07 RX ADMIN — HALOPERIDOL DECANOATE 10 MILLIGRAM(S): 100 INJECTION INTRAMUSCULAR at 14:28

## 2023-10-07 RX ADMIN — HALOPERIDOL DECANOATE 5 MILLIGRAM(S): 100 INJECTION INTRAMUSCULAR at 18:27

## 2023-10-07 NOTE — BH INPATIENT PSYCHIATRY ASSESSMENT NOTE - NSPRESENTSXS_PSY_ALL_CORE
Alert and interactive, no focal deficits Impulsivity/Hopelessness or despair/Refusal or inability to complete safety plan Depressed mood/Anhedonia/Impulsivity/Refusal or inability to complete safety plan

## 2023-10-07 NOTE — BH INPATIENT PSYCHIATRY ASSESSMENT NOTE - NSBHCRANIAL_PSY_ALL_CORE
Recognizes 2 fingers or can read (II)/Normal speech (IX, X, XII)/Extraocular Eye Movement Intact  (III, IV, VI)/Hearing intact (VIII)

## 2023-10-07 NOTE — BH INPATIENT PSYCHIATRY ASSESSMENT NOTE - NSBHASSESSSUMMFT_PSY_ALL_CORE
Patient is a 42-year-old man with a history of polysubstance use,  violent and suicidal behavior with multiple hospitalizations, at least some of which were immediately after  episodes of heavy alcohol and drug use.   Agitated and apparently intoxicated while in the emergency room he reported active homicidal ideation towards his mother and active suicidal ideation towards himself, identifying his recent binge as a suicide attempt.   Some 30 hours after admission he is evaluated on the day inpatient psych unit  and admits to past episodes of suicidal behavior and violence but denies feelings or thoughts of same at this time.   He is somnolent but arousable oriented to situation person and grossly to time.  Did not know he was on  [place] but knew he was in   [place].    Problem:  fluctuant mental status with history and threats of violent behavior.  Continue Haldol 10 twice daily as requested by patient  Problem: Polysubstance use  Catch consult

## 2023-10-07 NOTE — BH INPATIENT PSYCHIATRY ASSESSMENT NOTE - ADDITIONAL DETAILS ALL
reports multiple past intentional drug overdoses including last night   On treatment unit he is vague and contradictory about homicidality and suicidality.   He does however assiduously deny, and has not manifested behaviors consistent with, any current thoughts of self-harm or assaultiveness.

## 2023-10-07 NOTE — BH INPATIENT PSYCHIATRY ASSESSMENT NOTE - NSCOMMENTSUICRISKFACT_PSY_ALL_CORE
He reports multiple attempts/gestures. Any particular suicidal or parasuicidal behavior might not result in  death, But lifetime risk is highly elevated.  based on reports in the ED, he has multiple suicidal attempts and gestures, any one of them which may not be successful, but elevates lifetime risk markedly

## 2023-10-07 NOTE — BH PATIENT PROFILE - NSSBIRTALCPOSREINDET_GEN_A_CORE
Encouraged to share information and if using alcohol, practice abstinence or limit the amount of alcohol intake

## 2023-10-07 NOTE — BH INPATIENT PSYCHIATRY ASSESSMENT NOTE - NSREFERRALSOURCE_PSY_ALL_CORE
RX sent   Detail Level: Detailed Depth Of Biopsy: dermis Was A Bandage Applied: Yes Size Of Lesion In Cm: 0 Biopsy Type: H and E Biopsy Method: double edge Personna blade Anesthesia Type: 1% lidocaine without epinephrine Anesthesia Volume In Cc (Will Not Render If 0): 0.5 Hemostasis: Aluminum Chloride Wound Care: Petrolatum Dressing: bandage Destruction After The Procedure: No Type Of Destruction Used: Curettage Curettage Text: The wound bed was treated with curettage after the biopsy was performed. Cryotherapy Text: The wound bed was treated with cryotherapy after the biopsy was performed. Electrodesiccation Text: The wound bed was treated with electrodesiccation after the biopsy was performed. Electrodesiccation And Curettage Text: The wound bed was treated with electrodesiccation and curettage after the biopsy was performed. Silver Nitrate Text: The wound bed was treated with silver nitrate after the biopsy was performed. Lab: 6 Lab Facility: 3 Consent: Written consent was obtained and risks were reviewed including but not limited to scarring, infection, bleeding, scabbing, incomplete removal, nerve damage and allergy to anesthesia. Post-Care Instructions: I reviewed with the patient in detail post-care instructions. Wash site twice daily with gentle soap and water and apply vaseline and bandage. Notification Instructions: Patient will be notified of biopsy results. However, patient instructed to call the office if not contacted within 2 weeks. Billing Type: Third-Party Bill Information: Selecting Yes will display possible errors in your note based on the variables you have selected. This validation is only offered as a suggestion for you. PLEASE NOTE THAT THE VALIDATION TEXT WILL BE REMOVED WHEN YOU FINALIZE YOUR NOTE. IF YOU WANT TO FAX A PRELIMINARY NOTE YOU WILL NEED TO TOGGLE THIS TO 'NO' IF YOU DO NOT WANT IT IN YOUR FAXED NOTE. ED Admission
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
## 2023-10-07 NOTE — BH INPATIENT PSYCHIATRY ASSESSMENT NOTE - OTHER
treatment/medication seeking  poor by history adequate on the unit to manage behavior and request medication  impulse control, explaining compromised in the past. On treatment unit he has not patient will not aggressive behavior nor threatening it.   Was somewhat loud at times however  at times used please and thank you regarding requests  Patient clearly expressed homicidal and suicidal ideas while in the ED  after having binging on alcohol and other substances.  Currently denying  at times speech was   Garbled, at other times clear.

## 2023-10-07 NOTE — CONSULT NOTE ADULT - ASSESSMENT
41 yo M PMHx Polysubstance abuse(etoh, crack, opiates), bipolar disorder (previously on medications, previously hospitalized) presenting for SI/HI and diffuse myalgias.    #  43 yo M PMHx Polysubstance abuse (etoh, crack, opiates), bipolar disorder (previously on medications, previously hospitalized) presenting for SI/HI and diffuse myalgias.    # Polysubstance abuse   # Bipolar d/o  - Management per psych     # Myalgia   - Could be due to drugs (rigors, clonus)  vs medications  - Can start hydration with NS @75 cc/hr  - F/u CPK, TSH, B12    # Anemia  - Appears microcytic   - Hg 9.3 MCV 75.6  - F/u iron panel  - F/u FOBT    DVT ppx: lovenox  GI ppx: Not indicated   Diet: Regular   Activity: IAT  Dispo: Per psych  41 yo M PMHx Polysubstance abuse (etoh, crack, opiates), bipolar disorder (previously on medications, previously hospitalized) presenting for SI/HI and diffuse myalgias.    # Polysubstance abuse   # Bipolar d/o  - Management per psych     # Myalgia   - Could be due to drugs (rigors, clonus)  vs medications  - Can start hydration with NS @75 cc/hr  - F/u CPK, TSH, B12    # Anemia  - Appears microcytic   - Endorses blood in the stool  - Hg 9.3 MCV 75.6  - Monitor Hg  - F/u iron panel  - F/u FOBT  - Get GI eval when pt amendable to full examination     DVT ppx: hold, can start scd  GI ppx: Not indicated   Diet: Regular   Activity: IAT  Dispo: Per psych

## 2023-10-07 NOTE — BH PATIENT PROFILE - NSSUBRIEFINTERVENTION_PSY_A_CORE
Drinking or using substances at the unhealthy identified increases the risk of alcohol or substance abuse related health problems. Drinking or using substances at the unhealthy identified increases the risk of alcohol or substance abuse related health problems./Alcohol or substance use can interact with existing medical conditions and/or medication i.e. (Hypertension, depression, anxiety, insomnia, injury, congestive heart failure, diabetes, breast cancer risk)/If the patient has condition or takes medication with contraindications to drinking or substance use, it is recommended to abstain from drinking or substance use, or to drink or use substances below the recommended limits.

## 2023-10-07 NOTE — BH PATIENT PROFILE - NSCMSPTSTRENGTHS_PSY_ALL_CORE
Mom would like to know if patient can take anything else for nasal congestion     Dr. avelar thinks Regular mucinex, called Dr. Dumont office and left a message asking if Dr. Dumont was okay with patient taking medication     Waiting on call back, no extension found for cardiology yet        pt uncooperative/Unable to obtain (specify)
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
## 2023-10-07 NOTE — BH INPATIENT PSYCHIATRY ASSESSMENT NOTE - CURRENT MEDICATION
MEDICATIONS  (STANDING):    MEDICATIONS  (PRN):  diphenhydrAMINE 50 milliGRAM(s) Oral every 6 hours PRN Extrapyramidal prophylaxis  haloperidol     Tablet 5 milliGRAM(s) Oral every 6 hours PRN Agitation  LORazepam     Tablet 2 milliGRAM(s) Oral every 4 hours PRN Anxiety

## 2023-10-07 NOTE — BH PATIENT PROFILE - NSSBIRTDRGBRIEFINTDET_GEN_A_CORE
Using illicit substances at the unhealthy amount identified increases the risk of substance abuse related health problems.  Illicit substance use can interact with existing medical conditions and/ or medication I.e. (hypertension, depression, anxiety, insomnia, injury, congestive heart failure, diabetes, breast cancer risk)   If the patient has a condition or takes medication with contraindications to illicit substance use, it is recommended to abstain from illicit substance use, it is recommended to abstain from illicit substance use, or use substances below the recommended limits.

## 2023-10-07 NOTE — BH INPATIENT PSYCHIATRY ASSESSMENT NOTE - NSBHADMITMEDEDUDETAILS_PSY_A_CORE FT
Patient currently is receiving haloperidol when she reports keeps him calm.  There is no evidence of other major psychiatric symptomatology

## 2023-10-07 NOTE — BH INPATIENT PSYCHIATRY ASSESSMENT NOTE - NSBHSACONSEQUENCE_PSY_A_CORE FT
n/a  All reports regarding drug and alcohol use seem to be vague and at times contradictory.   He denies history of withdrawal seizures and currently is demonstrating no tremors, no diaphoresis etc.

## 2023-10-07 NOTE — BH INPATIENT PSYCHIATRY ASSESSMENT NOTE - NSBHMSETHTPROC_PSY_A_CORE
Mom verbalized DC and follow up instructions/ No questions at this time. Patient has no signs of resp distress or no change in LOC. Denies any pain at this time. Patient walked out with mom      Linear

## 2023-10-07 NOTE — BH INPATIENT PSYCHIATRY ASSESSMENT NOTE - NSDCCRITERIA_PSY_ALL_CORE
Patient can be discharged when he No longer manifests psychiatric symptoms that constitute a danger to himself or other individuals
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
## 2023-10-07 NOTE — CONSULT NOTE ADULT - SUBJECTIVE AND OBJECTIVE BOX
Hospital Day:  1d    Subjective:    Patient is a 42y old  Male who presents with a chief complaint of     Admitted to medicine for a primary diagnosis of     Past Medical Hx:   Polysubstance abuse    Bipolar illness      Past Sx:    Allergies:  No Known Allergies    Current Meds:   Standng Meds:    PRN Meds:  diphenhydrAMINE 50 milliGRAM(s) Oral every 6 hours PRN Extrapyramidal prophylaxis  haloperidol     Tablet 5 milliGRAM(s) Oral every 6 hours PRN Agitation  LORazepam     Tablet 2 milliGRAM(s) Oral every 4 hours PRN Anxiety    HOME MEDICATIONS:      Vital Signs:   T(F): 97.9 (10-06-23 @ 22:39), Max: 98.2 (10-06-23 @ 17:00)  HR: 57 (10-06-23 @ 22:39) (56 - 73)  BP: 177/87 (10-06-23 @ 22:39) (123/77 - 177/87)  RR: 18 (10-06-23 @ 22:39) (18 - 18)  SpO2: 98% (10-06-23 @ 22:39) (98% - 100%)        Physical Exam:   GENERAL: NAD  HEENT: NCAT  CHEST/LUNG: CTAB  HEART: Regular rate and rhythm; s1 s2 appreciated, No murmurs, rubs, or gallops  ABDOMEN: Soft, Nontender, Nondistended; Bowel sounds present  EXTREMITIES: No LE edema b/l  SKIN: no rashes, no new lesions  NERVOUS SYSTEM:  Alert & Oriented X3  LINES/CATHETERS:        Labs:                         9.3    6.59  )-----------( 200      ( 06 Oct 2023 01:42 )             32.9       06 Oct 2023 01:42    141    |  105    |  11     ----------------------------<  107    3.9     |  23     |  0.6      Ca    9.1        06 Oct 2023 01:42    TPro  7.1    /  Alb  4.3    /  TBili  0.2    /  DBili  x      /  AST  102    /  ALT  84     /  AlkPhos  130    06 Oct 2023 01:42                    Urinalysis Basic - ( 06 Oct 2023 13:31 )    Color: Yellow / Appearance: Clear / S.009 / pH: x  Gluc: x / Ketone: Negative mg/dL  / Bili: Negative / Urobili: 0.2 mg/dL   Blood: x / Protein: Negative mg/dL / Nitrite: Negative   Leuk Esterase: Negative / RBC: x / WBC x   Sq Epi: x / Non Sq Epi: x / Bacteria: x             Hospital Day:  1d    Subjective:    Patient is a 42y old  Male who presents with a chief complaint of SI. Found laying in bed, altered, eyes closed, slurring his words.     Admitted to medicine for a primary diagnosis of SI    Past Medical Hx:   Polysubstance abuse    Bipolar illness      Past Sx:    Allergies:  No Known Allergies    Current Meds:   Standng Meds:    PRN Meds:  diphenhydrAMINE 50 milliGRAM(s) Oral every 6 hours PRN Extrapyramidal prophylaxis  haloperidol     Tablet 5 milliGRAM(s) Oral every 6 hours PRN Agitation  LORazepam     Tablet 2 milliGRAM(s) Oral every 4 hours PRN Anxiety    HOME MEDICATIONS:      Vital Signs:   T(F): 97.9 (10-06-23 @ 22:39), Max: 98.2 (10-06-23 @ 17:00)  HR: 57 (10-06-23 @ 22:39) (56 - 73)  BP: 177/87 (10-06-23 @ 22:39) (123/77 - 177/87)  RR: 18 (10-06-23 @ 22:39) (18 - 18)  SpO2: 98% (10-06-23 @ 22:39) (98% - 100%)        Physical Exam:   GENERAL: appears stated age, unkempt  Pt refused        Labs:                         9.3    6.59  )-----------( 200      ( 06 Oct 2023 01:42 )             32.9       06 Oct 2023 01:42    141    |  105    |  11     ----------------------------<  107    3.9     |  23     |  0.6      Ca    9.1        06 Oct 2023 01:42    TPro  7.1    /  Alb  4.3    /  TBili  0.2    /  DBili  x      /  AST  102    /  ALT  84     /  AlkPhos  130    06 Oct 2023 01:42                    Urinalysis Basic - ( 06 Oct 2023 13:31 )    Color: Yellow / Appearance: Clear / S.009 / pH: x  Gluc: x / Ketone: Negative mg/dL  / Bili: Negative / Urobili: 0.2 mg/dL   Blood: x / Protein: Negative mg/dL / Nitrite: Negative   Leuk Esterase: Negative / RBC: x / WBC x   Sq Epi: x / Non Sq Epi: x / Bacteria: x

## 2023-10-07 NOTE — BH INPATIENT PSYCHIATRY ASSESSMENT NOTE - NSCOMMENTSUICPROTRISKFACT_PSY_ALL_CORE
Patient statements of being contradictory and difficult to assess.  It is likely however that he has attempted suicide in the past, may have few protective factors but currently is denying suicidal intent and not demonstrating any self-destructive behaviors.

## 2023-10-07 NOTE — BH INPATIENT PSYCHIATRY ASSESSMENT NOTE - NSTXSUBMISINTERMD_PSY_ALL_CORE
Catch team consult,  counseling  possible use of medications for either psychiatric symptoms or craving

## 2023-10-08 VITALS — SYSTOLIC BLOOD PRESSURE: 115 MMHG | HEART RATE: 65 BPM | DIASTOLIC BLOOD PRESSURE: 72 MMHG

## 2023-10-08 LAB
FERRITIN SERPL-MCNC: 14 NG/ML — LOW (ref 30–400)
VIT B12 SERPL-MCNC: 463 PG/ML — SIGNIFICANT CHANGE UP (ref 232–1245)

## 2023-10-08 RX ORDER — IBUPROFEN 200 MG
400 TABLET ORAL ONCE
Refills: 0 | Status: COMPLETED | OUTPATIENT
Start: 2023-10-08 | End: 2023-10-08

## 2023-10-08 RX ORDER — ACETAMINOPHEN 500 MG
650 TABLET ORAL EVERY 6 HOURS
Refills: 0 | Status: DISCONTINUED | OUTPATIENT
Start: 2023-10-08 | End: 2023-10-11

## 2023-10-08 RX ORDER — HALOPERIDOL DECANOATE 100 MG/ML
10 INJECTION INTRAMUSCULAR ONCE
Refills: 0 | Status: COMPLETED | OUTPATIENT
Start: 2023-10-08 | End: 2023-10-08

## 2023-10-08 RX ORDER — HALOPERIDOL DECANOATE 100 MG/ML
5 INJECTION INTRAMUSCULAR EVERY 6 HOURS
Refills: 0 | Status: DISCONTINUED | OUTPATIENT
Start: 2023-10-08 | End: 2023-10-11

## 2023-10-08 RX ADMIN — HALOPERIDOL DECANOATE 5 MILLIGRAM(S): 100 INJECTION INTRAMUSCULAR at 14:16

## 2023-10-08 RX ADMIN — Medication 2 MILLIGRAM(S): at 14:15

## 2023-10-08 RX ADMIN — HALOPERIDOL DECANOATE 10 MILLIGRAM(S): 100 INJECTION INTRAMUSCULAR at 18:48

## 2023-10-08 RX ADMIN — Medication 400 MILLIGRAM(S): at 18:34

## 2023-10-08 RX ADMIN — Medication 50 MILLIGRAM(S): at 14:15

## 2023-10-08 RX ADMIN — HALOPERIDOL DECANOATE 10 MILLIGRAM(S): 100 INJECTION INTRAMUSCULAR at 21:03

## 2023-10-08 RX ADMIN — Medication 650 MILLIGRAM(S): at 17:50

## 2023-10-08 NOTE — BH SOCIAL WORK INITIAL PSYCHOSOCIAL EVALUATION - NSBHSACONSEQUENCE_PSY_A_CORE FT
All reports regarding drug and alcohol use seem to be vague and at times contradictory.   He denies history of withdrawal seizures and currently is demonstrating no tremors, no diaphoresis etc.
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
## 2023-10-08 NOTE — BH INPATIENT PSYCHIATRY PROGRESS NOTE - PRN MEDS
MEDICATIONS  (PRN):  diphenhydrAMINE 50 milliGRAM(s) Oral every 6 hours PRN Agitation or anxiety not responding to reassurance  diphenhydrAMINE 50 milliGRAM(s) Oral once PRN agitation  haloperidol     Tablet 5 milliGRAM(s) Oral every 6 hours PRN psychotic agitation  LORazepam     Tablet 2 milliGRAM(s) Oral every 6 hours PRN Agitation or anxiety not responding to reassurance   MEDICATIONS  (PRN):  acetaminophen     Tablet .. 650 milliGRAM(s) Oral every 6 hours PRN Mild Pain (1 - 3)  diphenhydrAMINE 50 milliGRAM(s) Oral once PRN agitation  diphenhydrAMINE 50 milliGRAM(s) Oral every 6 hours PRN Agitation or anxiety not responding to reassurance  haloperidol     Tablet 5 milliGRAM(s) Oral every 6 hours PRN psychotic agitation  LORazepam     Tablet 2 milliGRAM(s) Oral every 6 hours PRN Agitation or anxiety not responding to reassurance

## 2023-10-08 NOTE — BH INPATIENT PSYCHIATRY PROGRESS NOTE - NSBHFUPINTERVALHXFT_PSY_A_CORE
Chart reviewed , discussed with staff , patient evaluated.  Patient remains intermittently agitated  , demanding to be discharged. irritable , angry , yelling  at the staff. patient  accepted  po PRN haldol , ativan and benadryl.  denies s/h  ideations. denies a/v hallucination.

## 2023-10-08 NOTE — BH INPATIENT PSYCHIATRY PROGRESS NOTE - CURRENT MEDICATION
MEDICATIONS  (STANDING):  haloperidol     Tablet 10 milliGRAM(s) Oral two times a day    MEDICATIONS  (PRN):  diphenhydrAMINE 50 milliGRAM(s) Oral every 6 hours PRN Agitation or anxiety not responding to reassurance  diphenhydrAMINE 50 milliGRAM(s) Oral once PRN agitation  haloperidol     Tablet 5 milliGRAM(s) Oral every 6 hours PRN psychotic agitation  LORazepam     Tablet 2 milliGRAM(s) Oral every 6 hours PRN Agitation or anxiety not responding to reassurance   MEDICATIONS  (STANDING):  haloperidol     Tablet 10 milliGRAM(s) Oral two times a day    MEDICATIONS  (PRN):  acetaminophen     Tablet .. 650 milliGRAM(s) Oral every 6 hours PRN Mild Pain (1 - 3)  diphenhydrAMINE 50 milliGRAM(s) Oral once PRN agitation  diphenhydrAMINE 50 milliGRAM(s) Oral every 6 hours PRN Agitation or anxiety not responding to reassurance  haloperidol     Tablet 5 milliGRAM(s) Oral every 6 hours PRN psychotic agitation  LORazepam     Tablet 2 milliGRAM(s) Oral every 6 hours PRN Agitation or anxiety not responding to reassurance

## 2023-10-08 NOTE — BH INPATIENT PSYCHIATRY PROGRESS NOTE - NSBHCHARTREVIEWVS_PSY_A_CORE FT
Vital Signs Last 24 Hrs  T(C): --  T(F): --  HR: --  BP: --  BP(mean): --  RR: --  SpO2: --     Vital Signs Last 24 Hrs  T(C): --  T(F): --  HR: 65 (10-08-23 @ 17:56) (65 - 65)  BP: 115/72 (10-08-23 @ 17:56) (115/72 - 115/72)  BP(mean): --  RR: --  SpO2: --

## 2023-10-08 NOTE — BH INPATIENT PSYCHIATRY PROGRESS NOTE - OTHER
Patient clearly expressed homicidal and suicidal ideas while in the ED  after having binging on alcohol and other substances.  Currently denying  poor by history adequate on the unit to manage behavior and request medication  impulse control, explaining compromised in the past. On treatment unit he has not patient will not aggressive behavior nor threatening it.   Was somewhat loud at times however  at times used please and thank you regarding requests  at times speech was   Garbled, at other times clear.

## 2023-10-08 NOTE — BH INPATIENT PSYCHIATRY PROGRESS NOTE - NSBHMETABOLIC_PSY_ALL_CORE_FT
BMI:   HbA1c:   Glucose:   BP: 177/87 (10-06-23 @ 22:39) (120/78 - 177/87)  Lipid Panel:  152 BMI:   HbA1c:   Glucose:   BP: 115/72 (10-08-23 @ 17:56) (115/72 - 177/87)  Lipid Panel:

## 2023-10-09 RX ORDER — DIPHENHYDRAMINE HCL 50 MG
50 CAPSULE ORAL ONCE
Refills: 0 | Status: COMPLETED | OUTPATIENT
Start: 2023-10-09 | End: 2023-10-09

## 2023-10-09 RX ORDER — HALOPERIDOL DECANOATE 100 MG/ML
5 INJECTION INTRAMUSCULAR ONCE
Refills: 0 | Status: COMPLETED | OUTPATIENT
Start: 2023-10-09 | End: 2023-10-09

## 2023-10-09 RX ADMIN — HALOPERIDOL DECANOATE 5 MILLIGRAM(S): 100 INJECTION INTRAMUSCULAR at 18:09

## 2023-10-09 RX ADMIN — Medication 50 MILLIGRAM(S): at 18:09

## 2023-10-09 RX ADMIN — HALOPERIDOL DECANOATE 5 MILLIGRAM(S): 100 INJECTION INTRAMUSCULAR at 12:54

## 2023-10-09 RX ADMIN — Medication 2 MILLIGRAM(S): at 12:53

## 2023-10-09 RX ADMIN — Medication 50 MILLIGRAM(S): at 08:16

## 2023-10-09 RX ADMIN — Medication 50 MILLIGRAM(S): at 12:54

## 2023-10-09 RX ADMIN — Medication 2 MILLIGRAM(S): at 18:09

## 2023-10-09 RX ADMIN — HALOPERIDOL DECANOATE 10 MILLIGRAM(S): 100 INJECTION INTRAMUSCULAR at 08:09

## 2023-10-09 RX ADMIN — Medication 2 MILLIGRAM(S): at 08:16

## 2023-10-09 NOTE — BH TREATMENT PLAN - NSTXSUICIDINTERSW_PSY_ALL_CORE
will met with patient daily to provider support and referal to assist with coping and managing symptoms.

## 2023-10-09 NOTE — BH TREATMENT PLAN - NSTXSUICIDINTERRN_PSY_ALL_CORE
RN to encourage verbalization of feelings  RN to encourage medication compliance and provide support as needed on Dx, coping skills, medication&safe planning  RN to encourage daily ADLs

## 2023-10-09 NOTE — BH INPATIENT PSYCHIATRY PROGRESS NOTE - PRN MEDS
MEDICATIONS  (PRN):  acetaminophen     Tablet .. 650 milliGRAM(s) Oral every 6 hours PRN Mild Pain (1 - 3)  diphenhydrAMINE 50 milliGRAM(s) Oral every 6 hours PRN Agitation or anxiety not responding to reassurance  diphenhydrAMINE 50 milliGRAM(s) Oral once PRN agitation  haloperidol     Tablet 5 milliGRAM(s) Oral every 6 hours PRN psychotic agitation  LORazepam     Tablet 2 milliGRAM(s) Oral every 6 hours PRN Agitation or anxiety not responding to reassurance

## 2023-10-09 NOTE — BH INPATIENT PSYCHIATRY PROGRESS NOTE - OTHER
poor by history adequate on the unit to manage behavior and request medication Patient was called and notified.    Patient clearly expressed homicidal and suicidal ideas while in the ED  after having binging on alcohol and other substances.  Currently denying  poor by history adequate on the unit to manage behavior and request medication.  his disruptive behavior  impulse control, explaining compromised in the past. On treatment unit he has   Been loud and intimidating  patient tends to talk about most of the time today he yelled and screamed

## 2023-10-09 NOTE — BH INPATIENT PSYCHIATRY PROGRESS NOTE - CURRENT MEDICATION
MEDICATIONS  (STANDING):  haloperidol     Tablet 10 milliGRAM(s) Oral two times a day    MEDICATIONS  (PRN):  acetaminophen     Tablet .. 650 milliGRAM(s) Oral every 6 hours PRN Mild Pain (1 - 3)  diphenhydrAMINE 50 milliGRAM(s) Oral every 6 hours PRN Agitation or anxiety not responding to reassurance  diphenhydrAMINE 50 milliGRAM(s) Oral once PRN agitation  haloperidol     Tablet 5 milliGRAM(s) Oral every 6 hours PRN psychotic agitation  LORazepam     Tablet 2 milliGRAM(s) Oral every 6 hours PRN Agitation or anxiety not responding to reassurance
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
## 2023-10-09 NOTE — BH TREATMENT PLAN - NSTXSUBMISINTERRN_PSY_ALL_CORE
Monitor for withdrawal symptoms  Educate patient on the risk and effects of substance misuse  Encourged patient to find alternatives  Provide patient with education on coping skills  Encouraged compliance with medication  Offer meds as needed

## 2023-10-09 NOTE — BH INPATIENT PSYCHIATRY PROGRESS NOTE - NSBHCHARTREVIEWVS_PSY_A_CORE FT
Vital Signs Last 24 Hrs  T(C): --  T(F): --  HR: 65 (10-08-23 @ 17:56) (65 - 65)  BP: 115/72 (10-08-23 @ 17:56) (115/72 - 115/72)  BP(mean): --  RR: --  SpO2: --

## 2023-10-10 DIAGNOSIS — F19.90 OTHER PSYCHOACTIVE SUBSTANCE USE, UNSPECIFIED, UNCOMPLICATED: ICD-10-CM

## 2023-10-10 RX ORDER — HALOPERIDOL DECANOATE 100 MG/ML
5 INJECTION INTRAMUSCULAR ONCE
Refills: 0 | Status: COMPLETED | OUTPATIENT
Start: 2023-10-10 | End: 2023-10-10

## 2023-10-10 RX ORDER — DIPHENHYDRAMINE HCL 50 MG
50 CAPSULE ORAL ONCE
Refills: 0 | Status: COMPLETED | OUTPATIENT
Start: 2023-10-10 | End: 2023-10-10

## 2023-10-10 RX ADMIN — Medication 50 MILLIGRAM(S): at 14:07

## 2023-10-10 RX ADMIN — HALOPERIDOL DECANOATE 10 MILLIGRAM(S): 100 INJECTION INTRAMUSCULAR at 08:43

## 2023-10-10 RX ADMIN — Medication 650 MILLIGRAM(S): at 00:14

## 2023-10-10 RX ADMIN — HALOPERIDOL DECANOATE 10 MILLIGRAM(S): 100 INJECTION INTRAMUSCULAR at 20:02

## 2023-10-10 RX ADMIN — HALOPERIDOL DECANOATE 5 MILLIGRAM(S): 100 INJECTION INTRAMUSCULAR at 00:13

## 2023-10-10 RX ADMIN — Medication 50 MILLIGRAM(S): at 00:13

## 2023-10-10 RX ADMIN — Medication 2 MILLIGRAM(S): at 00:13

## 2023-10-10 RX ADMIN — Medication 2 MILLIGRAM(S): at 19:17

## 2023-10-10 RX ADMIN — Medication 2 MILLIGRAM(S): at 08:46

## 2023-10-10 RX ADMIN — HALOPERIDOL DECANOATE 5 MILLIGRAM(S): 100 INJECTION INTRAMUSCULAR at 14:07

## 2023-10-10 RX ADMIN — Medication 650 MILLIGRAM(S): at 01:02

## 2023-10-10 RX ADMIN — Medication 2 MILLIGRAM(S): at 14:07

## 2023-10-10 NOTE — BH INPATIENT PSYCHIATRY PROGRESS NOTE - NSBHATTESTBILLING_PSY_A_CORE
99221-Initial OBS or IP - low complexity OR 40-54 mins
62972-Tlpguftlsu OBS or IP - low complexity OR 25-34 mins
37012-Kebamoglzh OBS or IP - low complexity OR 25-34 mins

## 2023-10-10 NOTE — BH INPATIENT PSYCHIATRY PROGRESS NOTE - NSBHFUPINTERVALHXFT_PSY_A_CORE
Nurse reports patient. Patient took all prescribed medications.    On approach patient was located __ , doing __ , and with __ in the room. Patient was alert to self, location, date, and situation. When asked how you are doing today, patient responded "__".  Patient is reporting no new symptoms. Patient is reporting no new side effects from medications. Patient is reporting that they are sleeping and eating okay. Patient is not reporting any additional questions or concerns at this time. Nurse reports patient has been agitated and difficult behaviorally as he is emotionally labile and demanding. Patient did take all prescribed medications last night and this AM, and also required PRN haldol and ativan this morning for agitation prior to rounds.    Patient approached this writer this morning in the [place], demanding he be discharged on our very first interaction, and began to become agitated when we discussed doing our due diligence in regards to his presentation and case. Patient is denying current SI/HI/AVH, stating he said all of those things because he was intoxicated and he "needed a place to stay". Patient is stating he is now ready to leave, and that he wouldn't harm anyone, stating his family and mother are in [place] regardless. Patient states he has a psychiatrist in [place] at [facility] named Dr. Zhu (spelling per patient - not sure if correct), which he is scheduled to see on Oct 29th.    Patient was alert to self, location, date, and situation. Patient is reporting that they are sleeping and eating okay. Patient is not reporting any additional questions or concerns at this time.

## 2023-10-10 NOTE — BH INPATIENT PSYCHIATRY PROGRESS NOTE - NSBHATTESTCOMMENTATTENDFT_PSY_A_CORE
I have reviewed case with resident and made any necessary changes or additions.  I concur with findings and plan.

## 2023-10-10 NOTE — UM REPORT PROGRESS NOTE - NSUMRMPROVIDER_GEN_A_CORE FT
Patient: Мария Vanegas   : 1980  INSURANCE: [employer]   ID# [id number]  [phone]        Spoke to Fiordaliza, Auth # [id number]. Patient has been approved for 5 days form 10/6-10/10. Review due 10/10, no CM has been assigned. Clinicals have been faxed in for additional requested days. fax: [phone].  Patient: Мария Vanegas   : 1980  INSURANCE: [employer]   ID# [id number]  [phone]        Spoke to Winstoncarlonathalie, Auth # [id number]. Patient has been approved for 5 days form 10/6-10/10. Review due 10/10, no CM has been assigned. Clinicals have been faxed in for additional requested days. fax: [phone].     10/11/2023 Clinicals have been sent over and awaiting determination for additional days last coverage date was 10/10/2023 with review due on 10/11/23

## 2023-10-10 NOTE — BH INPATIENT PSYCHIATRY PROGRESS NOTE - OTHER
patient tends to talk about most of the time today he yelled and screamed  poor by history adequate on the unit to manage behavior and request medication  poor by history adequate on the unit to manage behavior and request medication.  his disruptive behavior  impulse control, explaining compromised in the past. On treatment unit he has   Been loud and intimidating  Patient clearly expressed homicidal and suicidal ideas while in the ED  after having binging on alcohol and other substances.  Currently denying

## 2023-10-11 DIAGNOSIS — S06.9XAA UNSPECIFIED INTRACRANIAL INJURY WITH LOSS OF CONSCIOUSNESS STATUS UNKNOWN, INITIAL ENCOUNTER: ICD-10-CM

## 2023-10-11 RX ORDER — DIPHENHYDRAMINE HCL 50 MG
50 CAPSULE ORAL ONCE
Refills: 0 | Status: COMPLETED | OUTPATIENT
Start: 2023-10-11 | End: 2023-10-11

## 2023-10-11 RX ORDER — HALOPERIDOL DECANOATE 100 MG/ML
1 INJECTION INTRAMUSCULAR
Qty: 30 | Refills: 0
Start: 2023-10-11 | End: 2023-10-25

## 2023-10-11 RX ORDER — HALOPERIDOL DECANOATE 100 MG/ML
5 INJECTION INTRAMUSCULAR ONCE
Refills: 0 | Status: COMPLETED | OUTPATIENT
Start: 2023-10-11 | End: 2023-10-11

## 2023-10-11 RX ADMIN — Medication 650 MILLIGRAM(S): at 05:07

## 2023-10-11 RX ADMIN — Medication 2 MILLIGRAM(S): at 01:18

## 2023-10-11 RX ADMIN — HALOPERIDOL DECANOATE 5 MILLIGRAM(S): 100 INJECTION INTRAMUSCULAR at 01:19

## 2023-10-11 RX ADMIN — Medication 2 MILLIGRAM(S): at 09:15

## 2023-10-11 RX ADMIN — HALOPERIDOL DECANOATE 5 MILLIGRAM(S): 100 INJECTION INTRAMUSCULAR at 09:15

## 2023-10-11 RX ADMIN — Medication 50 MILLIGRAM(S): at 09:15

## 2023-10-11 RX ADMIN — Medication 50 MILLIGRAM(S): at 01:19
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
## 2023-10-11 NOTE — CONSULT NOTE ADULT - ASSESSMENT
42yMale pmh bipolar disorder, polysubstance abuse admitted for suicidal idealizations consulted to GI for possible blood in stool and CROW. Patient reports he never had blood in stool denies GI workup    Problem 1-CROW no gross GI bleeding  patient refused rectal exam  refused blood work today  Rec  -patient refusing GI workup  - Follow up with our [facility] located at [address] [place] [address]. Phone Number: [phone] to follow-up outpatient to re-consider EGD/Colonoscopy   -Maintain Hemodynamic Stability   -Monitor CBC  -CMP,Optimize Electrolytes  -PT,PTT,INR  -EKG, Chest-Xray   -Transfuse prn to hgb >8  -Two large bore IV lines  -PPI daily  -Monitor Vital Signs  -Monitor Stool For blood, frequency, consistency, melena  -Active Type and Screen  -Iron Studies, Folate, Vitamin B12 levels     Problem 2-altered LFTs  patient with polysubstance abuse history  Rec  -monitor LFTS and INR-patient refusing blood work  -polysubstance cessation advised  -RUQ ultrasound-patient refusing just wants to leave  -acute hepatitis panel-patient refusing blood work  - Follow up with our [facility] located at [address] [place] [address]. Phone Number: [phone].
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
## 2023-10-11 NOTE — BH INPATIENT PSYCHIATRY DISCHARGE NOTE - VIOLENCE RISK FACTORS:
Feeling of being under threat and being unable to control threat/Antisocial behavior/cognition (past or present)/Substance abuse/Affective dysregulation/Impulsivity/Lack of insight into violence risk/need for treatment/Community stressors that increase the risk of destabilization/Irritability

## 2023-10-11 NOTE — BH INPATIENT PSYCHIATRY DISCHARGE NOTE - OTHER
Patient is now preoccupied with being discharge. Patient clearly expressed homicidal and suicidal ideas while in the ED  after having binging on alcohol and other substances.  Currently denying  poor by history, but admits to psychiatric illness and states he will follow up with treatment and has not declined medication on the unit.  impulse control, explaining compromised in the past. On treatment unit he has   Been loud and intimidating  poor by history and increasing poor on the unit, difficulty to managing behavior

## 2023-10-11 NOTE — BH INPATIENT PSYCHIATRY PROGRESS NOTE - OTHER
impulse control, explaining compromised in the past. On treatment unit he has   Been loud and intimidating  poor by history, but admits to psychiatric illness and states he will follow up with treatment and has not declined medication on the unit.  poor by history and increasing poor on the unit, difficulty to managing behavior  Patient is now preoccupied with being discharge. Patient clearly expressed homicidal and suicidal ideas while in the ED  after having binging on alcohol and other substances.  Currently denying

## 2023-10-11 NOTE — BH INPATIENT PSYCHIATRY DISCHARGE NOTE - NSDCCPCAREPLAN_GEN_ALL_CORE_FT
PRINCIPAL DISCHARGE DIAGNOSIS  Diagnosis: Bipolar disorder  Assessment and Plan of Treatment: Mixed bipolar disorder is a mental health disorder in which a person has episodes of emotional highs (jomar), lows (depression), or both of these feelings at the same time. People with this disorder have very big mood changes (mood swings) that happen quickly on a regular basis. These episodes may be severe enough to cause problems with relationships, school, or work. In some cases, they can cause the person to be unsafe, and the person may need to stay in a hospital.  Your health care provider may suggest certain medicines if he or she thinks that these will help improve your condition. Avoid using caffeine, alcohol, and other substances that may prevent your medicines from working properly.  If you are taking medicines as part of your treatment, do not stop taking them before asking if it is safe to stop. You may need to have the medicine slowly decreased (tapered) over time to lower the risk of harmful side effects.      SECONDARY DISCHARGE DIAGNOSES  Diagnosis: TBI (traumatic brain injury)  Assessment and Plan of Treatment: Traumatic brain injury happens when there is a hard, direct hit to the head or when an object penetrates the skull and enters the brain.  Traumatic brain injuries may be mild, moderate, or severe. Treatment depends on the severity of your injury.  Get help right away if you have a head injury and you develop seizures, confusion, vomiting, weakness in the arms or legs, slurred speech, and other symptoms.

## 2023-10-11 NOTE — BH DISCHARGE NOTE NURSING/SOCIAL WORK/PSYCH REHAB - PATIENT PORTAL LINK FT
You can access the FollowMyHealth Patient Portal offered by [facility] by registering at the following website: [email or web address]. By joining [employer]’s FollowMyHealth portal, you will also be able to view your health information using other applications (apps) compatible with our system.
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
## 2023-10-11 NOTE — CONSULT NOTE ADULT - SUBJECTIVE AND OBJECTIVE BOX
Chief complaint/Reason for consult: possible blood in stool, CROW    HPI: 42yMale pmh bipolar disorder, polysubstance abuse admitted for suicidal idealizations consulted to GI for possible blood in stool and CROW. Patient reports he never had blood in stool denies GI workup. Patient denies nausea, vomiting, hematemesis, melena, blood in stool, diarrhea, constipation, abdominal pain.      PAST MEDICAL & SURGICAL HISTORY:   Polysubstance abuse      Bipolar illness      No significant past surgical history            Family history:  FAMILY HISTORY:    No GI cancers in first or second degree relatives    Social History: +polysubstance abuse    Allergies:   No Known Allergies      MEDICATIONS  (STANDING):  haloperidol     Tablet 10 milliGRAM(s) Oral two times a day    MEDICATIONS  (PRN):  acetaminophen     Tablet .. 650 milliGRAM(s) Oral every 6 hours PRN Mild Pain (1 - 3)  diphenhydrAMINE 50 milliGRAM(s) Oral every 6 hours PRN Agitation or anxiety not responding to reassurance  haloperidol     Tablet 5 milliGRAM(s) Oral every 6 hours PRN psychotic agitation  LORazepam     Tablet 2 milliGRAM(s) Oral every 6 hours PRN Agitation or anxiety not responding to reassurance        REVIEW OF SYSTEMS  General:  No weight loss, fevers, or chills.  Eyes:  No reported pain or visual changes  ENT:  No sore throat or runny nose.  NECK: No stiffness or lymphadenopathy  CV:  No chest pain or palpitations.  Resp:  No shortness of breath, cough, wheezing or hemoptysis  GI:  No abdominal pain, nausea, vomiting, dysphagia, diarrhea or constipation. No rectal bleeding, melena, or hematemesis.  Muscle:  No aches or weakness  Neuro:  No tingling, numbness       VITALS:   T(F): --  HR: --  BP: --  RR: --  SpO2: --    PHYSICAL EXAM:  GENERAL: AAOx3, no acute distress.  HEAD:  Atraumatic, Normocephalic  EYES: conjunctiva and sclera clear  NECK: Supple, No thyromegaly   CHEST/LUNG: Clear to auscultation bilaterally; No wheeze, rhonchi, or rales  HEART: Regular rate and rhythm; normal S1, S2, No murmurs.  ABDOMEN: Soft, nontender, nondistended; Bowel sounds present  NEUROLOGY: No asterixis or tremor  SKIN: Intact, no jaundice  Rectal exam-patient refused        LABS:  patient refused              IMAGING:    < from: CT Head No Cont (10.06.23 @ 02:13) >  ACC: [id number] EXAM:  CT CERVICAL SPINE   ORDERED BY: KIKO OVER     ACC: [id number] EXAM:  CT BRAIN   ORDERED BY: KIKO OVER     PROCEDURE DATE:  10/06/2023          INTERPRETATION:  REASON FOR EXAM: Fall, altered mental status    Technique: Noncontrast head CT. Contiguous unenhanced CT axial images of   the head from the base to the vertex with sagittal and coronal reformats.   Noncontrast cervical spine CT. Contiguous unenhanced CT axial images of   the cervical spine with sagittal and coronal reformats.    COMPARISON: None    FINDINGS:    HEAD:    The ventricles and cortical sulci pattern are age appropriate.    Gray-white matter differentiation is maintained. There is no acute   intracranial hemorrhage, extra-axial fluid collection or midline shift.    No acute osseous abnormality/depressed skull fracture is identified.   Mucosal thickening of the ethmoid air cells, sphenoid and maxillary   sinuses. Frontal sinuses and mastoid air cells are clear.    Age-indeterminate nasal bone fractures with overlying mild soft tissue   edema.    CERVICAL SPINE:    There is straightening of the normal cervical lordosis, which may be on   the basis of pain, muscle spasm, positioning or a combination of the   above. There is no evidence ofacute fracture or facet subluxation. No   significant prevertebral soft tissue swelling.    The disc space heights are maintained. No significant disc herniation,   spinal stenosis or foraminal impingement is demonstrated.    IMPRESSION:    HEAD:  1. No CT evidence for acute intracranial pathology.  2.  Age-indeterminate nasal bone fractures with overlying mild soft   tissue edema, correlate with physical examination.    CERVICAL SPINE:  1.  No CT evidence for acute cervical spine fracture or subluxation.    --- End of Report ---          WILL VALENTIN DO; Resident Radiologist  This document has been electronically signed.  ANNA MARIE JC MD; Attending Radiologist  This document has been electronically signed. Oct  6 2023  8:56AM    < end of copied text >

## 2023-10-11 NOTE — BH INPATIENT PSYCHIATRY PROGRESS NOTE - NSBHMSEMOOD_PSY_A_CORE
Irritable/Angry
reported tmax 101.7 fever started Thursday.  was seen by urgent care Sunday started Keflex, for UTI  pt reports sores in her mouth  rapid strep (-)
Irritable/Angry

## 2023-10-11 NOTE — BH INPATIENT PSYCHIATRY DISCHARGE NOTE - NSACTIVEVENT_PSY_ALL_CORE
Triggering events leading to humiliation, shame, and/or despair (e.g., Loss of relationship, financial or health status) (real or anticipated)/Legal problems/Current or pending social isolation/Chronic pain or other acute medical condition/Pending incarceration or homelessness
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
## 2023-10-11 NOTE — BH INPATIENT PSYCHIATRY PROGRESS NOTE - NSBHCONSDANGERSELF_PSY_A_CORE
suicidal ideation with plan and means/unable to care for self

## 2023-10-11 NOTE — BH INPATIENT PSYCHIATRY PROGRESS NOTE - NSICDXBHSECONDARYDX_PSY_ALL_CORE
Depression, unspecified depression type   F32.A  
Depression, unspecified depression type   F32.A  Polysubstance use disorder   F19.90  
Depression, unspecified depression type   F32.A  Polysubstance use disorder   F19.90  
Depression, unspecified depression type   F32.A

## 2023-10-11 NOTE — BH DISCHARGE NOTE NURSING/SOCIAL WORK/PSYCH REHAB - NSCDUDCCRISIS_PSY_A_CORE
[facility]  1 (233) [facility] ([phone])  Text "WELL" to 83000  Website: www.teextee.Affectiva/.National Suicide Prevention Lifeline 4 (824) 313-2228/.  [employer]  1 (101) LIFENET (149-9403)/988 Suicide and Crisis Lifeline

## 2023-10-11 NOTE — BH INPATIENT PSYCHIATRY PROGRESS NOTE - NSBHFUPINTERVALCCFT_PSY_A_CORE
"I want to go home doctor"
"I just said it to have a place to stay"
" I want to get out of here while you doing this to me" 
" I want to discharged"

## 2023-10-11 NOTE — BH INPATIENT PSYCHIATRY PROGRESS NOTE - NSTXSUICIDGOAL_PSY_ALL_CORE
Will identify and utilize 2 coping skills
Be able to report that they independently used a coping skill instead of hurting oneself
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
## 2023-10-11 NOTE — BH INPATIENT PSYCHIATRY PROGRESS NOTE - NSBHPSYCHOLCOGORIENT_PSY_A_CORE
Oriented to time, place, person, situation
Oriented to time, place, person, situation
Not fully oriented...
Oriented to time, place, person, situation

## 2023-10-11 NOTE — BH INPATIENT PSYCHIATRY PROGRESS NOTE - NSBHROSSYSTEMS_PSY_ALL_CORE
PSYCHOTHERAPY NOTE      Zina Rodriguez is a 50 y.o. female who presents with anxiety/depression. Client was seen for a virtual individual psychotherapy session that lasted for 50 minutes. Progress:  Client presents overall with an improved mood and affect. She does state that there are times when she tends to worry and become anxious. We processed this in session and explored ways to self soothe. Also sent client some informational handouts on ways to combat worrying. Client has been trying to stay busy. She has had friends to visit and this has been beneficial to mood. 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 Client looked into some volunteer work but there are not many virtual options. 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 Discussed possibility of advocacy work on their behalf. Overall, client is demonstrating improvement. Focused on reframing thoughts and reducing anxiety. Mental Status exam:         Sensorium  oriented to time, place and person   Relations cooperative   Appearance:  casually dressed   Motor Behavior:  within normal limits   Speech:  normal pitch and normal volume   Thought Process: goal directed and logical   Thought Content free of delusions and free of hallucinations   Suicidal ideations none   Homicidal ideations none   Mood:  euthymic   Affect:  euthymic   Memory recent  adequate   Memory remote:  adequate   Concentration:  adequate   Abstraction:  abstract   Insight:  fair   Reliability fair   Judgment:  fair         DIAGNOSIS AND IMPRESSION:    Axis I: Adjustment Disorder with Mixed Emotional Features  Axis II: Deferred    Axis III:   Past Medical History:   Diagnosis Date    Anemia associated with acute blood loss 2017    Txd with Blood transfusions x 2. Dr. Sahni Flair.     Asthma childhood    DDD (degenerative disc disease), lumbar     DJD (degenerative joint
disease) of knee     JANAE (generalized anxiety disorder) 2018    Dr. Liana Howard    GERD (gastroesophageal reflux disease)     GI bleeding 2017    Dr. Tyson Otoole. Txd with Blood transfusions.  History of tuberculosis exposure 2013    POSITIVE PPD TEST. Txd x 6 months; last dose either 11/13 or 12/13    Lower leg DVT (deep venous thromboembolism), acute, right ([address] 2008, 4/27/2016, 08/03/2017    x 3. Initially due to trauma to leg then plane ride home. Dr. Zoey Amos. Chronic Anticoagulation.  Major depression 2018    Dr. Liana Howard    Morbid obesity ([address]     Orthostatic syncope 2017    due to anemia from blood loss. Dr. Kaitlyn Muñiz.  Post-thrombotic syndrome of right lower extremity 09/14/2017    w  R leg swelling and pain. Dr. Nixon Novoa.  Pseudogout 2016    R knee. Dr. Donna Guerra.  Psoriatic arthritis ([facility] [address] 2000s    Dr. Jaelyn Leo. Txd w Laverle Tamworth injections monthly.  PUD (peptic ulcer disease)     Dr. Vito Soliman.  Shingles 03/2019    R ACW. R upper back previously. Wen Siu.  Skin abrasion 01/28/2014    After loosing 125#, Bilateral Inner thigh friction flareup monthly with skin breakdown    Thromboembolus ([address] 2008    Rt leg,  pt states she fell and had a plane ride home and developed blood clots    Ulcerative colitis ([address]     Uterine fibroid 2017    x 4. Dr. Salmeron Neither. Axis IV: Problems with primary support group, Problems related to social environment, Problems with access to health care services and Other psychosocial or environmental problems  Axis V:  61-70 mild symptoms    Interventions/plans:   Follow up in 3 weeks      Pursuant to the emergency declaration under the [facility], [address] [place] [address] and the [employer] Act, this Virtual Visit was conducted, with patient and parent and/or guardian's consent, to reduce the patient's risk of exposure to
COVID-19 and provide continuity of care for an established patient. Due to the state of emergency related to the coronavirus, the [place] of [employer] and the [place] [employer] is allowing practitioners holding my licensure and/or certification status the ability to provide telehealth services without the usual requirements.
Psychiatric

## 2023-10-11 NOTE — BH INPATIENT PSYCHIATRY PROGRESS NOTE - PRN MEDS
A (Kit Intro 4fr 21ga 10cm 7cm .018in Stf Dil Ntnl Gw) catheter was used to selectively engage and inject the L femoral  artery by hand injection.  MEDICATIONS  (PRN):  acetaminophen     Tablet .. 650 milliGRAM(s) Oral every 6 hours PRN Mild Pain (1 - 3)  diphenhydrAMINE 50 milliGRAM(s) Oral every 6 hours PRN Agitation or anxiety not responding to reassurance  haloperidol     Tablet 5 milliGRAM(s) Oral every 6 hours PRN psychotic agitation  LORazepam     Tablet 2 milliGRAM(s) Oral every 6 hours PRN Agitation or anxiety not responding to reassurance

## 2023-10-11 NOTE — BH DISCHARGE NOTE NURSING/SOCIAL WORK/PSYCH REHAB - NSBHDCAGENCY1FT_PSY_A_CORE
[facility]  With Valencia Marin

## 2023-10-11 NOTE — BH DISCHARGE NOTE NURSING/SOCIAL WORK/PSYCH REHAB - NSBHDCAGENCY2FT_PSY_A_CORE
[facility]  [facility]   With Zonia and Janell

## 2023-10-11 NOTE — BH INPATIENT PSYCHIATRY PROGRESS NOTE - NSICDXBHPRIMARYDX_PSY_ALL_CORE
Polysubstance use disorder   F19.90  
Bipolar disorder   F31.9  
Bipolar disorder   F31.9  
Polysubstance use disorder   F19.90

## 2023-10-11 NOTE — BH INPATIENT PSYCHIATRY PROGRESS NOTE - CURRENT MEDICATION
MEDICATIONS  (STANDING):  diphenhydrAMINE Injectable 50 milliGRAM(s) IntraMuscular once  haloperidol     Tablet 10 milliGRAM(s) Oral two times a day  haloperidol    Injectable 5 milliGRAM(s) IntraMuscular once  LORazepam   Injectable 2 milliGRAM(s) IntraMuscular once    MEDICATIONS  (PRN):  acetaminophen     Tablet .. 650 milliGRAM(s) Oral every 6 hours PRN Mild Pain (1 - 3)  diphenhydrAMINE 50 milliGRAM(s) Oral every 6 hours PRN Agitation or anxiety not responding to reassurance  haloperidol     Tablet 5 milliGRAM(s) Oral every 6 hours PRN psychotic agitation  LORazepam     Tablet 2 milliGRAM(s) Oral every 6 hours PRN Agitation or anxiety not responding to reassurance

## 2023-10-11 NOTE — BH INPATIENT PSYCHIATRY PROGRESS NOTE - NSBHMSEINTELL_PSY_A_CORE
Dr Jt Muñoz at bedside to assess patient. Pt to be discharged home. Pt agreeable to plan of care. Monitors removed.
Unable to assess

## 2023-10-11 NOTE — BH INPATIENT PSYCHIATRY DISCHARGE NOTE - NSDCRECOMMEND_PSY_ALL_CORE
Recommended laboratory tests/other investigations following discharge.../Recommended medical follow-up following discharge...

## 2023-10-11 NOTE — BH INPATIENT PSYCHIATRY PROGRESS NOTE - NSTXSUBMISGOAL_PSY_ALL_CORE
Accept referral for substance abuse treatment on discharge

## 2023-10-11 NOTE — BH INPATIENT PSYCHIATRY DISCHARGE NOTE - NSBHDCMEDICALFT_PSY_A_CORE
# Myalgia   - Could be due to drugs (rigors, clonus)  vs medications  - Can start hydration with NS @75 cc/hr  - F/u CPK, TSH, B12    # Anemia  - Appears microcytic   - Endorses blood in the stool  - Hg 9.3 MCV 75.6  - Monitor Hg  - iron panel showed decrease serum iron, increased TIBC and decrease serum ferritin  - F/u FOBT  - Get GI eval when pt amendable to full examination     DVT ppx: hold, can start scd  GI ppx: Not indicated   Diet: Regular   Activity: IAT  Dispo: Per psych
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
## 2023-10-11 NOTE — BH INPATIENT PSYCHIATRY DISCHARGE NOTE - NSBHDCSIGEVENTSFT_PSY_A_CORE
Several instances where hospital security had to be called for patient agitation, aggressiveness, and behavioral dysregulation. Patient received PRN Haldol, Ativan, and Benadryl multiple times during his admission, and during most days.

## 2023-10-11 NOTE — BH INPATIENT PSYCHIATRY PROGRESS NOTE - NSBHMSESPABN_PSY_A_CORE
Loud volume/Pressured rate/Impaired articulation/Other

## 2023-10-11 NOTE — BH DISCHARGE NOTE NURSING/SOCIAL WORK/PSYCH REHAB - NSBHREFERPURPOSE2_PSY_ALL_CORE
How Severe Is Your Skin Lesion?: mild Has Your Skin Lesion Been Treated?: not been treated Is This A New Presentation, Or A Follow-Up?: [facility]

## 2023-10-15 ENCOUNTER — EMERGENCY (EMERGENCY)
Facility: HOSPITAL | Age: 43
LOS: 1 days | Discharge: HOME | End: 2023-10-15
Admitting: EMERGENCY MEDICINE
Payer: MEDICAID

## 2023-10-15 VITALS
DIASTOLIC BLOOD PRESSURE: 92 MMHG | RESPIRATION RATE: 18 BRPM | HEART RATE: 99 BPM | OXYGEN SATURATION: 98 % | SYSTOLIC BLOOD PRESSURE: 141 MMHG | HEIGHT: 66 IN | TEMPERATURE: 98 F

## 2023-10-15 PROCEDURE — 99283 EMERGENCY DEPT VISIT LOW MDM: CPT

## 2023-10-15 NOTE — ED PROVIDER NOTE - PHYSICAL EXAMINATION
const: well developed, no acute distress  hent: ncat, protecting airway  respir: no conversational dyspnea, tachypnea, or signs of respiratory distress  abd: no emesis on clothes  msk: moving all extremities normally  neuro: awake and conversant  skin: no obvious lacerations or abrasions  psych: no apparent risk or self or others

## 2023-10-15 NOTE — ED ADULT TRIAGE NOTE - CHIEF COMPLAINT QUOTE
pt. reports he's been up for 2 days partying, this morning he smoked weed that he thinks may have been laced with something. Pt. reports he is feeling anxious and sweaty. Pt. in no acute distress.

## 2023-10-15 NOTE — ED PROVIDER NOTE - OBJECTIVE STATEMENT
43yo M presents with c/o anxiety after smoking weed which he believed to have been contaminated. pt denies chest pain, sob, dizziness, n/v, abd pain. denies any injuries or trauma.

## 2023-10-15 NOTE — ED PROVIDER NOTE - CLINICAL SUMMARY MEDICAL DECISION MAKING FREE TEXT BOX
pt presents reporting anxiety after using marijuana. reports concern that there may have been additional drugs in his drugs. VSS. speaking clearly and ambulating with normal steady gait. pt elected not to stay for any interventions or monitoring and left prior to paperwork being printed.
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
## 2023-10-15 NOTE — ED PROVIDER NOTE - PATIENT PORTAL LINK FT
You can access the FollowMyHealth Patient Portal offered by [facility] by registering at the following website: [email or web address]. By joining [employer]’s FollowMyHealth portal, you will also be able to view your health information using other applications (apps) compatible with our system.

## 2023-10-17 DIAGNOSIS — R45.851 SUICIDAL IDEATIONS: ICD-10-CM

## 2023-10-17 DIAGNOSIS — F19.19 OTHER PSYCHOACTIVE SUBSTANCE ABUSE WITH UNSPECIFIED PSYCHOACTIVE SUBSTANCE-INDUCED DISORDER: ICD-10-CM

## 2023-10-17 DIAGNOSIS — F41.9 ANXIETY DISORDER, UNSPECIFIED: ICD-10-CM

## 2023-10-17 DIAGNOSIS — F14.99 COCAINE USE, UNSPECIFIED WITH UNSPECIFIED COCAINE-INDUCED DISORDER: ICD-10-CM

## 2023-10-17 DIAGNOSIS — F12.90 CANNABIS USE, UNSPECIFIED, UNCOMPLICATED: ICD-10-CM

## 2023-10-17 DIAGNOSIS — F31.9 BIPOLAR DISORDER, UNSPECIFIED: ICD-10-CM

## 2023-10-17 DIAGNOSIS — R45.850 HOMICIDAL IDEATIONS: ICD-10-CM

## 2023-10-17 DIAGNOSIS — F10.10 ALCOHOL ABUSE, UNCOMPLICATED: ICD-10-CM

## 2023-10-17 DIAGNOSIS — Z59.02 UNSHELTERED HOMELESSNESS: ICD-10-CM

## 2023-10-17 SDOH — ECONOMIC STABILITY - HOUSING INSECURITY: UNSHELTERED HOMELESSNESS: Z59.02

## 2023-10-18 NOTE — BH SOCIAL WORK CONFIRMATION FOLLOW UP NOTE - NSCOMMENTS_PSY_ALL_CORE
Jairon broke his outpatient mental health appointment at [facility] on 10/16, [phone]. Mobile crisis referral cannot be sent as patient was discharged to [facility] in [place] at his request ([facility] does not perform wellness checks at [facility]).

## 2023-12-27 ENCOUNTER — EMERGENCY (EMERGENCY)
Facility: HOSPITAL | Age: 43
LOS: 1 days | Discharge: HOME | End: 2023-12-27
Attending: EMERGENCY MEDICINE | Admitting: EMERGENCY MEDICINE
Payer: MEDICAID

## 2023-12-27 VITALS
RESPIRATION RATE: 16 BRPM | SYSTOLIC BLOOD PRESSURE: 117 MMHG | DIASTOLIC BLOOD PRESSURE: 71 MMHG | TEMPERATURE: 100 F | HEART RATE: 108 BPM | OXYGEN SATURATION: 95 %

## 2023-12-27 DIAGNOSIS — R51.9 HEADACHE, UNSPECIFIED: ICD-10-CM

## 2023-12-27 DIAGNOSIS — Z91.013 ALLERGY TO SEAFOOD: ICD-10-CM

## 2023-12-27 DIAGNOSIS — R41.82 ALTERED MENTAL STATUS, UNSPECIFIED: ICD-10-CM

## 2023-12-27 DIAGNOSIS — S00.81XA ABRASION OF OTHER PART OF HEAD, INITIAL ENCOUNTER: ICD-10-CM

## 2023-12-27 DIAGNOSIS — F14.90 COCAINE USE, UNSPECIFIED, UNCOMPLICATED: ICD-10-CM
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
## 2023-12-27 DIAGNOSIS — F16.90 HALLUCINOGEN USE, UNSPECIFIED, UNCOMPLICATED: ICD-10-CM

## 2023-12-27 LAB
GLUCOSE BLDC GLUCOMTR-MCNC: 102 MG/DL — HIGH (ref 70–99)
GLUCOSE BLDC GLUCOMTR-MCNC: 102 MG/DL — HIGH (ref 70–99)

## 2023-12-27 PROCEDURE — 70486 CT MAXILLOFACIAL W/O DYE: CPT | Mod: 26

## 2023-12-27 PROCEDURE — 99284 EMERGENCY DEPT VISIT MOD MDM: CPT

## 2023-12-27 PROCEDURE — 70450 CT HEAD/BRAIN W/O DYE: CPT | Mod: 26

## 2023-12-27 RX ORDER — ACETAMINOPHEN 500 MG
650 TABLET ORAL ONCE
Refills: 0 | Status: COMPLETED | OUTPATIENT
Start: 2023-12-27 | End: 2023-12-27

## 2023-12-27 RX ADMIN — Medication 650 MILLIGRAM(S): at 00:05

## 2023-12-27 NOTE — ED ADULT NURSE NOTE - NSFALLUNIVINTERV_ED_ALL_ED
Bed/Stretcher in lowest position, wheels locked, appropriate side rails in place/Call bell, personal items and telephone in reach/Instruct patient to call for assistance before getting out of bed/chair/stretcher/Non-slip footwear applied when patient is off stretcher/[place] to call system/Physically safe environment - no spills, clutter or unnecessary equipment/Purposeful proactive rounding/Room/bathroom lighting operational, light cord in reach Bed/Stretcher in lowest position, wheels locked, appropriate side rails in place/Call bell, personal items and telephone in reach/Instruct patient to call for assistance before getting out of bed/chair/stretcher/Non-slip footwear applied when patient is off stretcher/[place] to call system/Physically safe environment - no spills, clutter or unnecessary equipment/Purposeful proactive rounding/Room/bathroom lighting operational, light cord in reach

## 2023-12-27 NOTE — ED ADULT NURSE NOTE - CHIEF COMPLAINT QUOTE
Pt BIBA from [facility] c/o AMS. Admits to ETOH and PCP use. Pt arousible to verbal stimuli. Endorsing body pain. Pt BIBA from [facility] c/o AMS. Admits to ETOH and PCP use. Pt arousible to verbal stimuli. Endorsing body pain.

## 2023-12-27 NOTE — ED PROVIDER NOTE - PROGRESS NOTE DETAILS
Patient clinically sober. Tolerated PO. Steady gait, will dc. Conservative management discussed with the patient in detail.  Close PMD follow up encouraged.  Strict ED return instructions discussed in detail and patient given the opportunity to ask any questions about their discharge diagnosis and instructions

## 2023-12-27 NOTE — ED PROVIDER NOTE - PATIENT PORTAL LINK FT
You can access the FollowMyHealth Patient Portal offered by [facility] by registering at the following website: [email or web address]. By joining [employer]’s FollowMyHealth portal, you will also be able to view your health information using other applications (apps) compatible with our system. You can access the FollowMyHealth Patient Portal offered by [facility] by registering at the following website: [email or web address]. By joining [employer]’s FollowMyHealth portal, you will also be able to view your health information using other applications (apps) compatible with our system.

## 2023-12-27 NOTE — ED PROVIDER NOTE - CLINICAL SUMMARY MEDICAL DECISION MAKING FREE TEXT BOX
Soham, PGY3 - 42-year-old man presenting after ingestions of PCP and cocaine, endorsing headache posttrauma.  Will get CT head and max face for further evaluation, monitor for clinical improvement with eventual discharge. *The above represents an initial assessment/impression. Please refer to progress notes for potential changes in patient clinical course*
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
## 2023-12-27 NOTE — ED ADULT TRIAGE NOTE - CHIEF COMPLAINT QUOTE
Pt BIBA from [facility] c/o AMS. Admits to ETOH and PCP use. Pt arousible to verbal stimuli. Endorsing body pain. Pt BIBA from [facility] c/o AMS. Admits to ETOH and PCP use. Pt arousible to verbal stimuli. Endorsing body pain.
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
## 2023-12-27 NOTE — ED PROVIDER NOTE - NSFOLLOWUPINSTRUCTIONS_ED_ALL_ED_FT
DETOX/REHAB PROGRAMS:    [facility] Short Term Free Detox [place] - [address], [address]rd [place] [address]    [facility] Detox & Rehab Unit - [address], [place] [address] ([phone])      24-Hour Drop-In Centers For Adults:  [facility] (18+) - [address] (Subway: #[address] to [address]    [facility] (25+) - 800 [facility] [address] (Subway: [address])    [facility] (18+) - [address], [place] (Subway: A to [facility])    Drop-In Centers for Adults:  [facility] (18+) - [address] (Near [facility] - Subway: 1/2/3/A/C/E to [address]/[place])      PLEASE FOLLOW-UP WITH YOUR PRIMARY CARE DOCTOR IN 1-2 DAYS FOR FURTHER EVALUATION.      PLEASE TAKE ALL PAPERWORK FROM TODAY'S VISIT TO YOUR PRIMARY DOCTOR.     IF YOU DO NOT HAVE A PRIMARY CARE DOCTOR PLEASE REFER TO THE OFFICE/CLINIC INFORMATION GIVEN BELOW:    If you do not have a doctor, you can call our referral line to find a doctor that matches your insurance; the number is [phone].     You can also follow up with clinics listed below, if you do not have a doctor:  [address] [address]  To make an appointment, call [phone]    [facility]  Address: [address] [place] [address]  Appointment Center: [phone] ([phone])     PLEASE RETURN TO THE ER IMMEDIATELY OR CALL 111 ANY HIGH FEVER, CHEST PAIN, TROUBLE BREATHING, VOMITING, SEVERE PAIN, OR ANY OTHER CONCERNS.    To access your record on the patient portal [facility], please visit:  [email or web address]  If you are having difficulties setting this up, call [phone] and someone can assist you over the phone. DETOX/REHAB PROGRAMS:    [facility] Short Term Free Detox [place] - [address], [address] [place] [address]    [facility] Detox & Rehab Unit - [address], [place] [address] ([phone])      24-Hour Drop-In Centers For Adults:  [facility] (18+) - [address] (Subway: [address] to [address]    [facility] (25+) - 800 [facility] [address] (Subway: [address] to [address])    [facility] (18+) - [address], [place] (Subway: A to [facility])    Drop-In Centers for Adults:  [facility] (18+) - [address] (Near [facility] - Subway: 1/2/3/A/C/E to [address])      PLEASE FOLLOW-UP WITH YOUR PRIMARY CARE DOCTOR IN 1-2 DAYS FOR FURTHER EVALUATION.      PLEASE TAKE ALL PAPERWORK FROM TODAY'S VISIT TO YOUR PRIMARY DOCTOR.     IF YOU DO NOT HAVE A PRIMARY CARE DOCTOR PLEASE REFER TO THE OFFICE/CLINIC INFORMATION GIVEN BELOW:    If you do not have a doctor, you can call our referral line to find a doctor that matches your insurance; the number is [phone].     You can also follow up with clinics listed below, if you do not have a doctor:  [address] [address]  To make an appointment, call [phone]    [facility] [place]  Address: [address] [place] [address]  Appointment Center: [phone] ([phone])     PLEASE RETURN TO THE ER IMMEDIATELY OR CALL 171 ANY HIGH FEVER, CHEST PAIN, TROUBLE BREATHING, VOMITING, SEVERE PAIN, OR ANY OTHER CONCERNS.    To access your record on the patient portal [facility], please visit:  [email or web address]  If you are having difficulties setting this up, call [phone] and someone can assist you over the phone. No new/acute injuries were seen on your imaging. Please read all handouts provided to you from the emergency department.  Seek immediate medical attention for any new/worsening signs or symptoms.  You may take ibuprofen 600 mg every 6 hours and/or acetaminophen 650 mg every 4-6 hours as needed for pain.    To access your record on the patient portal [facility], please visit:  [email or web address]  If you are having difficulties setting this up, call [phone] and someone can assist you over the phone.     DETOX/REHAB PROGRAMS:    [facility] Short Term Free Detox [place] - [address], [address] [place] [address]    [facility] Detox & Rehab Unit - [address], [place] [address] ([phone])      24-Hour Drop-In Centers For Adults:  [facility] [place] (18+) - [address] (Subway: [address] to [address])    [facility] (25+) - 800 [facility] [address] (Subway: #6 to [facility])    [facility] (18+) - 240[address] [facility] (Subway: A to [facility])    Drop-In Centers for Adults:  [facility] (18+) - [address] (Near [facility] - Subway: 1/2/3/A/C/E to [address])      PLEASE FOLLOW-UP WITH YOUR PRIMARY CARE DOCTOR IN 1-2 DAYS FOR FURTHER EVALUATION.      PLEASE TAKE ALL PAPERWORK FROM TODAY'S VISIT TO YOUR PRIMARY DOCTOR.     IF YOU DO NOT HAVE A PRIMARY CARE DOCTOR PLEASE REFER TO THE OFFICE/CLINIC INFORMATION GIVEN BELOW:    If you do not have a doctor, you can call our referral line to find a doctor that matches your insurance; the number is [phone].     You can also follow up with clinics listed below, if you do not have a doctor:  [facility]  [address] [place] [address]  To make an appointment, call [phone]    [facility]  Address: [address] [place] [address]  Appointment Center: [phone] ([phone])     PLEASE RETURN TO THE ER IMMEDIATELY OR CALL 911 ANY HIGH FEVER, CHEST PAIN, TROUBLE BREATHING, VOMITING, SEVERE PAIN, OR ANY OTHER CONCERNS.    To access your record on the patient portal [facility], please visit:  [email or web address]  If you are having difficulties setting this up, call [phone] and someone can assist you over the phone. No new/acute injuries were seen on your imaging. Please read all handouts provided to you from the emergency department.  Seek immediate medical attention for any new/worsening signs or symptoms.  You may take ibuprofen 600 mg every 6 hours and/or acetaminophen 650 mg every 4-6 hours as needed for pain.    To access your record on the patient portal [facility], please visit:  [email or web address]  If you are having difficulties setting this up, call [phone] and someone can assist you over the phone.     DETOX/REHAB PROGRAMS:    [facility] Short Term Free Detox [place] - [address], [address] [place] [address]    [facility] Detox & Rehab Unit - [address], [place] [address] ([phone])      24-Hour Drop-In Centers For Adults:  [facility] [place] (18+) - [address] (Subway: #[address] to [address])    [facility] (25+) - 800 [facility] [address] (Subway: #6 to [facility])    [facility] (18+) - 240[address] [facility] (Subway: A to [facility])    Drop-In Centers for Adults:  [facility] (18+) - [address] (Near [facility] - Subway: 1/2/3/A/C/E to [address])      PLEASE FOLLOW-UP WITH YOUR PRIMARY CARE DOCTOR IN 1-2 DAYS FOR FURTHER EVALUATION.      PLEASE TAKE ALL PAPERWORK FROM TODAY'S VISIT TO YOUR PRIMARY DOCTOR.     IF YOU DO NOT HAVE A PRIMARY CARE DOCTOR PLEASE REFER TO THE OFFICE/CLINIC INFORMATION GIVEN BELOW:    If you do not have a doctor, you can call our referral line to find a doctor that matches your insurance; the number is [phone].     You can also follow up with clinics listed below, if you do not have a doctor:  [facility]  [address] [place] [address]  To make an appointment, call [phone]    [facility]  Address: [address] [place] [address]  Appointment Center: [phone] ([phone])     PLEASE RETURN TO THE ER IMMEDIATELY OR CALL 911 ANY HIGH FEVER, CHEST PAIN, TROUBLE BREATHING, VOMITING, SEVERE PAIN, OR ANY OTHER CONCERNS.    To access your record on the patient portal [facility], please visit:  [email or web address]  If you are having difficulties setting this up, call [phone] and someone can assist you over the phone.
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
## 2024-05-24 NOTE — ED ADULT TRIAGE NOTE - BEFAST SCREENING
Quality 47: Advance Care Plan: Advance Care Planning discussed and documented in the medical record; patient did not wish or was not able to name a surrogate decision maker or provide an advance care plan. Quality 226: Preventive Care And Screening: Tobacco Use: Screening And Cessation Intervention: Patient screened for tobacco use, is a smoker AND received Cessation Counseling within measurement period or in the six months prior to the measurement period Detail Level: Detailed Quality 130: Documentation Of Current Medications In The Medical Record: Current Medications Documented Quality 431: Preventive Care And Screening: Unhealthy Alcohol Use - Screening: Patient identified as an unhealthy alcohol user when screened for unhealthy alcohol use using a systematic screening method and received brief counseling Negative

## 2024-09-13 NOTE — ED BEHAVIORAL HEALTH ASSESSMENT NOTE - NSBHMSEINTELL_PSY_A_CORE
Detail Level: Generalized Detail Level: Zone Detail Level: Simple Detail Level: Detailed Unable to assess

## 2024-11-05 NOTE — ED PROVIDER NOTE - EKG ADDITIONAL QUESTION - PERFORMED INDEPENDENT VISUALIZATION
Medication Samples    Is this a new prescription?  No    Have you received samples of this medication in the past?  When?  Yes, September: patient recently had Watchman procedure and is stating that he is to continue on Eliquis. He is requesting more samples.    Does your Insurance cover this medication?   Yes    Have you applied for patient assistance?   N/A    Medication:  apixaban (ELIQUIS)      Dosage of the medication:  5 MG TABS tablet      How are you taking this medication (QD, BID, TID, QID, PRN):  Take 1 tablet by mouth 2 times daily      When was your last appointment with cardiology?    (If 1 yr or longer, please schedule appointment)    (If patient has been told they do not need to follow-up - medications should be filled by PCP)  Watchman procedure 11/4    When did you last have labs drawn?     When will you run out of your medication?                     Yes

## 2025-01-04 NOTE — ED PROVIDER NOTE - PATIENT PORTAL LINK FT
-- DO NOT REPLY / DO NOT REPLY ALL --  -- This inbox is not monitored. If this was sent to the wrong provider or department, reroute message to P ECO Reroute pool. --  -- Message is from Engagement Center Operations (ECO) --      Message Type:  Refill Medication   Refill request for Pended medication named: [place]  Preferred pharmacy verified, and selected.   [id number] - [place], [place] - [address] AT [facility] OF [address]    Is the patient OUT of Medication?  Yes and After Hours- route as high priority to [facility] CLINICAL MSG pool. Patient has been advised it will be addressed within 1 business day.    Message: out of meds, requesting refill                Copied from CRM #[id number]. Topic: MW Medication/Rx - MW Rx Refill  >> Jan 4, 2025  3:56 PM Ashly PLUMMER wrote:  Chata Hedrick called to request a medication refill that is Out of medication or is critically low in meds during after hrs.      Medication is. listed on Med Management list. Selected 'Wrap Up CRM' and created new Refill Med Encounter after clicking 'Convert to Clinical Call'. Selected reason for call 'Refill Request'. Pended medication. Sent Med template and routed as high priority to [facility] CLINICAL MSG POOL.   You can access the FollowMyHealth Patient Portal offered by [facility] by registering at the following website: [email or web address]. By joining [facility]’s FollowMyHealth portal, you will also be able to view your health information using other applications (apps) compatible with our system.

## 2025-02-11 NOTE — ED PROVIDER NOTE - CARE PLAN
1 Detail Level: Detailed Detail Level: Generalized Detail Level: Zone Moisturizer Recommendations: Cerave or Amlactin Principal Discharge DX:	Alcohol intoxication, uncomplicated

## 2025-07-26 ENCOUNTER — EMERGENCY (EMERGENCY)
Facility: HOSPITAL | Age: 45
LOS: 1 days | End: 2025-07-26
Attending: EMERGENCY MEDICINE | Admitting: EMERGENCY MEDICINE
Payer: MEDICAID

## 2025-07-26 VITALS
HEART RATE: 72 BPM | OXYGEN SATURATION: 94 % | RESPIRATION RATE: 18 BRPM | TEMPERATURE: 98 F | SYSTOLIC BLOOD PRESSURE: 111 MMHG | DIASTOLIC BLOOD PRESSURE: 72 MMHG

## 2025-07-26 VITALS
RESPIRATION RATE: 16 BRPM | DIASTOLIC BLOOD PRESSURE: 73 MMHG | OXYGEN SATURATION: 93 % | TEMPERATURE: 98 F | HEART RATE: 83 BPM | WEIGHT: 229.94 LBS | SYSTOLIC BLOOD PRESSURE: 133 MMHG

## 2025-07-26 LAB — PCP SPEC-MCNC: SIGNIFICANT CHANGE UP

## 2025-07-26 PROCEDURE — 99284 EMERGENCY DEPT VISIT MOD MDM: CPT
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
## 2025-07-26 NOTE — ED ADULT NURSE NOTE - NSFALLRISKINTERV_ED_ALL_ED
Assistance OOB with selected safe patient handling equipment if applicable/Assistance with ambulation/Communicate fall risk and risk factors to all staff, patient, and family/Monitor gait and stability/Monitor for mental status changes and reorient to person, place, and time, as needed/Provide visual cue: yellow wristband, yellow gown, etc/Reinforce activity limits and safety measures with patient and family/Toileting schedule using arm’s reach rule for commode and bathroom/Use of alarms - bed, stretcher, chair and/or video monitoring/Call bell, personal items and telephone in reach/Instruct patient to call for assistance before getting out of bed/chair/stretcher/Non-slip footwear applied when patient is off stretcher/[place] to call system/Physically safe environment - no spills, clutter or unnecessary equipment/Purposeful Proactive Rounding/Room/bathroom lighting operational, light cord in reach
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
## 2025-07-26 NOTE — ED ADULT NURSE NOTE - OBJECTIVE STATEMENT
44 year old male BIBA for altered mental status. Pt was found sleeping outside of the police station. Pt endorses drug use but is unable to specify which ones. Pt able to stand steadily to use the urinal. Bed alarm on.

## 2025-07-26 NOTE — ED ADULT TRIAGE NOTE - NS ED TRIAGE AVPU SCALE
Alert-The patient is alert, awake and responds to voice. The patient is oriented to time, place, and person. The triage nurse is able to obtain subjective information. [Negative] : Genitourinary

## 2025-07-26 NOTE — ED ADULT NURSE NOTE - NS ED NURSE DC PT EDUCATION RESOURCES
Patient with underlying end-stage renal disease and hearing loss, status post initial renal transplant in 1997, currently on transplant list at [facility]3]   None needed

## 2025-07-27 PROBLEM — F19.10 OTHER PSYCHOACTIVE SUBSTANCE ABUSE, UNCOMPLICATED: Chronic | Status: ACTIVE | Noted: 2023-10-06

## 2025-07-27 PROBLEM — F31.9 BIPOLAR DISORDER, UNSPECIFIED: Chronic | Status: ACTIVE | Noted: 2023-10-06

## 2025-07-27 NOTE — ED PROVIDER NOTE - NSFOLLOWUPINSTRUCTIONS_ED_ALL_ED_FT
Accidental Drug Poisoning, Adult    Accidental drug poisoning happens when a person accidentally takes too much of a substance, such as a prescription medicine, an over-the-counter medicine, a vitamin, a supplement, or an illegal drug. The effects of drug poisoning can be mild, dangerous, or even deadly.    What are the causes?  This condition is caused by taking too much of a medicine, illegal drug, or other substance. It often results from:    Lack of knowledge about a substance.  Using more than one substance at the same time.  An error made by the health care provider who prescribed the substance.  An error made by the pharmacist who filled the prescription.  A lapse in memory, such as forgetting that you have already taken a dose of the medicine.  Suddenly using a substance after a long period of not using it.    The following substances and medicines are more likely to cause an accidental drug poisoning:    Medicines that treat mental problems (psychotropic medicines).  Pain medicines.  Cocaine.  Heroin.  Multivitamins that contain iron.  Over-the-counter cold and cough medicines.    What increases the risk?  This condition is more likely to occur in:    Elderly adults. Elderly adults are at risk because they may:    Be taking many different medicines.  Have difficulty reading labels.  Forget when they last took their medicine.  People who use illegal drugs.  People who drink alcohol while using illegal drugs or certain medicines.  People with certain mental health conditions.    What are the signs or symptoms?  Symptoms of this condition depend on the substance and the amount that was taken. Common symptoms include:    Behavior changes, such as confusion.  Sleepiness.  Weakness.  Slowed breathing.  Nausea and vomiting.  Seizures.  Very large or small eye pupil size.    A drug poisoning can cause a very serious condition in which your blood pressure drops to a low level (shock). Symptoms of shock include:    Cold and clammy skin.  Pale skin.  Blue lips.  Very slow breathing.  Extreme sleepiness.  Severe confusion.  Dizziness or fainting.    How is this diagnosed?  This condition is diagnosed based on:    Your symptoms. You will be asked about the substances you took and when you took them.  A physical exam.    You may also have other tests, including:    Urine tests.  Blood tests.  An electrocardiogram (ECG).    How is this treated?  This condition may need to be treated right away at the hospital. Treatment may involve:    Getting fluids and electrolytes through an IV.  Having a breathing tube inserted in your airway (endotracheal tube) to help you breathe.  Taking medicines. These may include medicines that:    Absorb any substance that is in your digestive system.  Block or reverse the effect of the substance that caused the drug poisoning.  Having your blood filtered through an artificial kidney machine (hemodialysis).  Ongoing counseling and mental health support. This may be provided if you used an illegal drug.    Follow these instructions at home:      Medicines     Take over-the-counter and prescription medicines only as told by your health care provider.  Before taking a new medicine, ask your health care provider whether the medicine:    May cause side effects.  Might react with other medicines.  Keep a list of all the medicines that you take, including over-the-counter medicines, vitamins, supplements, and herbs. Bring this list with you to all of your medical visits.        General instructions     Drink enough fluid to keep your urine pale yellow.  If you are working with a counselor or mental health professional, make sure to follow his or her instructions.  Do not drink alcohol if:    Your health care provider tells you not to drink.  You are pregnant, may be pregnant, or are planning to become pregnant.  If you drink alcohol, limit how much you have:    0–1 drink a day for women.  0–2 drinks a day for men.  Be aware of how much alcohol is in your drink. In the [place] one drink equals one typical bottle of beer (12 oz), one-half glass of wine (5 oz), or one shot of hard liquor (1½ oz).  Keep all follow-up visits as told by your health care provider. This is important.    How is this prevented?     Get help if you are struggling with:    Alcohol or drug use.  Depression or another mental health problem.  Keep the phone number of your local poison control center near your phone or on your cell phone. The hotline of the [employer] of Poison Control Centers is (612) 757-5201.  Store all medicines in safety containers that are out of the reach of children.  Read the drug inserts that come with your medicines.  Create a system for taking your medicine, such as a pillbox, that will help you avoid taking too much of the medicine.  Do not drink alcohol while taking medicines unless your health care provider approves.  Do not use illegal drugs.  Do not take medicines that are not prescribed for you.    Contact a health care provider if:  Your symptoms return.  You develop new symptoms or side effects after taking a medicine.  You have questions about possible drug poisoning. Call your local poison control center at (063) 186-5927.    Get help right away if:  You think that you or someone else may have taken too much of a substance.  You or someone else is having symptoms of drug poisoning.    Summary  Accidental drug poisoning happens when a person accidentally takes too much of a substance, such as a prescription medicine, an over-the-counter medicine, a vitamin, a supplement, or an illegal drug.  The effects of drug poisoning can be mild, dangerous, or even deadly.  This condition is diagnosed based on your symptoms and a physical exam. You will be asked to tell your health care provider which substances you took and when you took them.  This condition may need to be treated right away at the hospital. Alcohol intoxication occurs when a person no longer thinks clearly or functions well (becomes impaired) after drinking alcohol. Intoxication can occur with even one drink. The level of impairment depends on:    The amount of alcohol the person had.  The person's age, gender, and weight.  How often the person drinks.  Whether the person has other medical conditions, such as diabetes, seizures, or a heart condition.    Alcohol intoxication can range in severity from mild to severe. The condition can be dangerous, especially when caused by drinking large amounts of alcohol in a short period of time (binge drinking) or if the person also took certain prescription medicines or recreational drugs.    What are the signs or symptoms?  Symptoms of mild alcohol intoxication include:    Feeling relaxed or sleepy.  Mild difficulty with:  Coordination.  Speech.  Memory.  Attention.    Symptoms of moderate alcohol intoxication include:    Extreme emotions, such as anger or sadness.  Moderate difficulty with:  Coordination.  Speech.  Memory.  Attention.    Symptoms of severe alcohol intoxication include:    Passing out.  Vomiting.  Confusion.  Slow breathing.  Coma.  Severe difficulty with:  Coordination.  Speech.  Memory.  Attention.    Intoxication, especially in people who are not exposed to alcohol often can progress from mild to severe quickly, and may even cause coma or death.    How is this diagnosed?  This condition may be diagnosed based on:    A medical history.  A physical exam.  A blood test that measures the concentration of alcohol in the blood (blood alcohol content, or NICK).  Whether there is a smell of alcohol on the breath.    Your health care provider will ask you how much alcohol you drank and what kind of alcohol you had.    How is this treated?  Usually, treatment is not needed for this condition. Most of the effects of alcohol are temporary and go away as the alcohol naturally leaves the body. Your health care provider may recommend monitoring until the alcohol level starts to drop and it is safe to go home. You may also get fluids through an IV tube to help prevent dehydration. If the intoxication is severe, a breathing machine called a ventilator may be needed to support your breathing.    Follow these instructions at home:  Do not drive after drinking alcohol.  Have someone stay with you while you are intoxicated. You should not be left alone.  Stay hydrated. Drink enough fluid to keep your urine clear or pale yellow.  Avoid caffeine because it can dehydrate you.  Take over-the-counter and prescription medicines only as told by your health care provider.     How is this prevented?  To prevent alcohol intoxication:    Limit alcohol intake to no more than 1 drink a day for nonpregnant women and 2 drinks a day for men. One drink equals 12 oz of beer, 5 oz of wine, or 1½ oz of hard liquor.  Do not drink alcohol on an empty stomach.  Avoid drinking alcohol if:  You are under the legal drinking age.  You are pregnant or may be pregnant.  You are taking medicines that should not be taken with alcohol.  Your drinking causes your medical condition to get worse.  You need to drive or perform activities that require your attention.  You have substance use disorder.    To prevent potentially serious complications of alcohol intoxication, seek immediate medical care if you or someone you know has signs of moderate or severe alcohol intoxication. These include:    Moderate or severe difficulty with:  Coordination.  Speech.  Memory.  Attention.  Passing out.  Confusion.  Vomiting.    Do not leave someone alone if he or she is intoxicated.    Contact a health care provider if:  You do not feel better after a few days.  You are having problems at work, at school, or at home due to drinking.    Get help right away if:  You become shaky when you try to stop drinking.  You shake uncontrollably (have a seizure).  You vomit blood. Blood in vomit may look bright red, or it may look like coffee grounds.  You have blood in your stool. Blood in stool may be bright red, or it may make stool appear black and tarry and make it smell bad.  You become light-headed or you faint.    If you ever feel like you may hurt yourself or others, or have thoughts about taking your own life, get help right away. You can go to your nearest emergency department or call:    Your local emergency services (911 in the [place]  A suicide crisis helpline, such as the National Suicide Prevention Lifeline at 1-173.907.9843. This is open 24 hours a day.

## 2025-07-27 NOTE — ED PROVIDER NOTE - OBJECTIVE STATEMENT
NOTE:  RN Triage Note time-stamped 1814 appears to be a mistaken entry entered into the wrong patient's chart.    The patient was brought here by EMS for suspected alcohol intoxication without any report of fall/trauma/head injury, seizure, vomiting, or abnormal vital signs. He reportedly was found sleeping in front of a nearby police station.  Previous ED visit in past for AMS similar to this related to drug ingestion.  History and ROS limited due to current altered mental status.  Pt initially admitted to drug use to nurse but wouldn't elaborate which type, but he later denied drug use and advised me that it was only alcohol.

## 2025-07-27 NOTE — ED PROVIDER NOTE - PATIENT PORTAL LINK FT
You can access the FollowMyHealth Patient Portal offered by [facility] by registering at the following website: [email or web address]. By joining [facility]’s FollowMyHealth portal, you will also be able to view your health information using other applications (apps) compatible with our system.

## 2025-07-30 DIAGNOSIS — F17.210 NICOTINE DEPENDENCE, CIGARETTES, UNCOMPLICATED: ICD-10-CM

## 2025-07-30 DIAGNOSIS — F10.129 ALCOHOL ABUSE WITH INTOXICATION, UNSPECIFIED: ICD-10-CM

## 2025-08-13 ENCOUNTER — EMERGENCY (EMERGENCY)
Age: 45
LOS: 1 days | End: 2025-08-13
Attending: EMERGENCY MEDICINE | Admitting: EMERGENCY MEDICINE
Payer: MEDICAID

## 2025-08-13 VITALS
DIASTOLIC BLOOD PRESSURE: 80 MMHG | OXYGEN SATURATION: 96 % | TEMPERATURE: 98 F | HEART RATE: 60 BPM | RESPIRATION RATE: 60 BRPM | SYSTOLIC BLOOD PRESSURE: 126 MMHG

## 2025-08-13 DIAGNOSIS — Z59.00 HOMELESSNESS UNSPECIFIED: ICD-10-CM

## 2025-08-13 DIAGNOSIS — G89.29 OTHER CHRONIC PAIN: ICD-10-CM

## 2025-08-13 PROCEDURE — 99284 EMERGENCY DEPT VISIT MOD MDM: CPT

## 2025-08-13 RX ORDER — IBUPROFEN 200 MG
800 TABLET ORAL ONCE
Refills: 0 | Status: COMPLETED | OUTPATIENT
Start: 2025-08-13 | End: 2025-08-13

## 2025-08-13 SDOH — ECONOMIC STABILITY - HOUSING INSECURITY: HOMELESSNESS UNSPECIFIED: Z59.00

## 2025-08-26 ENCOUNTER — EMERGENCY (EMERGENCY)
Facility: HOSPITAL | Age: 45
LOS: 1 days | End: 2025-08-26
Admitting: STUDENT IN AN ORGANIZED HEALTH CARE EDUCATION/TRAINING PROGRAM
Payer: MEDICAID

## 2025-08-26 VITALS
RESPIRATION RATE: 18 BRPM | TEMPERATURE: 98 F | SYSTOLIC BLOOD PRESSURE: 125 MMHG | DIASTOLIC BLOOD PRESSURE: 80 MMHG | HEART RATE: 96 BPM | OXYGEN SATURATION: 95 %

## 2025-08-26 PROCEDURE — 99283 EMERGENCY DEPT VISIT LOW MDM: CPT

## 2025-08-28 DIAGNOSIS — F10.920 ALCOHOL USE, UNSPECIFIED WITH INTOXICATION, UNCOMPLICATED: ICD-10-CM

## 2025-08-28 DIAGNOSIS — R41.82 ALTERED MENTAL STATUS, UNSPECIFIED: ICD-10-CM
